# Patient Record
Sex: MALE | Race: BLACK OR AFRICAN AMERICAN | Employment: UNEMPLOYED | URBAN - METROPOLITAN AREA
[De-identification: names, ages, dates, MRNs, and addresses within clinical notes are randomized per-mention and may not be internally consistent; named-entity substitution may affect disease eponyms.]

---

## 2022-07-04 ENCOUNTER — APPOINTMENT (OUTPATIENT)
Dept: CT IMAGING | Age: 27
DRG: 053 | End: 2022-07-04
Attending: EMERGENCY MEDICINE
Payer: MEDICAID

## 2022-07-04 ENCOUNTER — APPOINTMENT (OUTPATIENT)
Dept: GENERAL RADIOLOGY | Age: 27
DRG: 053 | End: 2022-07-04
Attending: EMERGENCY MEDICINE
Payer: MEDICAID

## 2022-07-04 ENCOUNTER — APPOINTMENT (OUTPATIENT)
Dept: GENERAL RADIOLOGY | Age: 27
DRG: 053 | End: 2022-07-04
Attending: INTERNAL MEDICINE
Payer: MEDICAID

## 2022-07-04 ENCOUNTER — HOSPITAL ENCOUNTER (INPATIENT)
Age: 27
LOS: 5 days | Discharge: HOME | DRG: 053 | End: 2022-07-09
Attending: EMERGENCY MEDICINE | Admitting: HOSPITALIST
Payer: MEDICAID

## 2022-07-04 DIAGNOSIS — S09.90XA CLOSED HEAD INJURY, INITIAL ENCOUNTER: ICD-10-CM

## 2022-07-04 DIAGNOSIS — G40.901 STATUS EPILEPTICUS (HCC): Primary | ICD-10-CM

## 2022-07-04 DIAGNOSIS — S60.812A ABRASION OF LEFT WRIST, INITIAL ENCOUNTER: ICD-10-CM

## 2022-07-04 PROBLEM — F12.90 MARIJUANA USE: Status: ACTIVE | Noted: 2022-07-04

## 2022-07-04 PROBLEM — E87.0 HYPERNATREMIA: Status: ACTIVE | Noted: 2022-07-04

## 2022-07-04 PROBLEM — J96.90 RESPIRATORY FAILURE (HCC): Status: ACTIVE | Noted: 2022-07-04

## 2022-07-04 LAB
AMPHET UR QL SCN: NEGATIVE
ANION GAP SERPL CALC-SCNC: 20 MMOL/L (ref 3–18)
APPEARANCE UR: CLEAR
APTT PPP: 25 SEC (ref 23–36.4)
ARTERIAL PATENCY WRIST A: POSITIVE
ATRIAL RATE: 118 BPM
BACTERIA URNS QL MICRO: NEGATIVE /HPF
BARBITURATES UR QL SCN: NEGATIVE
BASE DEFICIT BLD-SCNC: 1.8 MMOL/L
BASE DEFICIT BLD-SCNC: 8.4 MMOL/L
BASOPHILS # BLD: 0 K/UL (ref 0–0.1)
BASOPHILS NFR BLD: 0 % (ref 0–2)
BDY SITE: ABNORMAL
BDY SITE: ABNORMAL
BENZODIAZ UR QL: POSITIVE
BILIRUB UR QL: NEGATIVE
BUN SERPL-MCNC: 19 MG/DL (ref 7–18)
BUN/CREAT SERPL: 13 (ref 12–20)
CALCIUM SERPL-MCNC: 9.8 MG/DL (ref 8.5–10.1)
CALCULATED P AXIS, ECG09: 44 DEGREES
CALCULATED R AXIS, ECG10: 86 DEGREES
CALCULATED T AXIS, ECG11: 32 DEGREES
CANNABINOIDS UR QL SCN: POSITIVE
CARBAMAZEPINE SERPL-MCNC: <0.5 UG/ML (ref 4–12)
CHLORIDE SERPL-SCNC: 113 MMOL/L (ref 100–111)
CK SERPL-CCNC: 431 U/L (ref 39–308)
CO2 SERPL-SCNC: 13 MMOL/L (ref 21–32)
COCAINE UR QL SCN: NEGATIVE
COLOR UR: YELLOW
CREAT SERPL-MCNC: 1.51 MG/DL (ref 0.6–1.3)
DIAGNOSIS, 93000: NORMAL
DIFFERENTIAL METHOD BLD: ABNORMAL
EOSINOPHIL # BLD: 0 K/UL (ref 0–0.4)
EOSINOPHIL NFR BLD: 0 % (ref 0–5)
EPITH CASTS URNS QL MICRO: NORMAL /LPF (ref 0–5)
ERYTHROCYTE [DISTWIDTH] IN BLOOD BY AUTOMATED COUNT: 11.8 % (ref 11.6–14.5)
ETHANOL SERPL-MCNC: <3 MG/DL (ref 0–3)
GAS FLOW.O2 O2 DELIVERY SYS: ABNORMAL L/MIN
GAS FLOW.O2 O2 DELIVERY SYS: ABNORMAL L/MIN
GAS FLOW.O2 SETTING OXYMISER: 18 BPM
GAS FLOW.O2 SETTING OXYMISER: 22 BPM
GLUCOSE BLD STRIP.AUTO-MCNC: 216 MG/DL (ref 70–110)
GLUCOSE BLD STRIP.AUTO-MCNC: 82 MG/DL (ref 70–110)
GLUCOSE SERPL-MCNC: 205 MG/DL (ref 74–99)
GLUCOSE UR STRIP.AUTO-MCNC: 500 MG/DL
HCO3 BLD-SCNC: 20.3 MMOL/L (ref 22–26)
HCO3 BLD-SCNC: 21.8 MMOL/L (ref 22–26)
HCT VFR BLD AUTO: 45.7 % (ref 36–48)
HDSCOM,HDSCOM: ABNORMAL
HGB BLD-MCNC: 14.4 G/DL (ref 13–16)
HGB UR QL STRIP: ABNORMAL
HYALINE CASTS URNS QL MICRO: NORMAL /LPF (ref 0–2)
IMM GRANULOCYTES # BLD AUTO: 0.1 K/UL (ref 0–0.04)
IMM GRANULOCYTES NFR BLD AUTO: 1 % (ref 0–0.5)
INR PPP: 1.1 (ref 0.8–1.2)
KETONES UR QL STRIP.AUTO: NEGATIVE MG/DL
LEUKOCYTE ESTERASE UR QL STRIP.AUTO: NEGATIVE
LYMPHOCYTES # BLD: 3.1 K/UL (ref 0.9–3.6)
LYMPHOCYTES NFR BLD: 29 % (ref 21–52)
MCH RBC QN AUTO: 31 PG (ref 24–34)
MCHC RBC AUTO-ENTMCNC: 31.5 G/DL (ref 31–37)
MCV RBC AUTO: 98.5 FL (ref 78–100)
METHADONE UR QL: NEGATIVE
MONOCYTES # BLD: 1 K/UL (ref 0.05–1.2)
MONOCYTES NFR BLD: 10 % (ref 3–10)
NEUTS SEG # BLD: 6.5 K/UL (ref 1.8–8)
NEUTS SEG NFR BLD: 61 % (ref 40–73)
NITRITE UR QL STRIP.AUTO: NEGATIVE
NRBC # BLD: 0 K/UL (ref 0–0.01)
NRBC BLD-RTO: 0 PER 100 WBC
O2/TOTAL GAS SETTING VFR VENT: 50 %
O2/TOTAL GAS SETTING VFR VENT: 50 %
OPIATES UR QL: NEGATIVE
P-R INTERVAL, ECG05: 142 MS
PCO2 BLD: 32.6 MMHG (ref 35–45)
PCO2 BLD: 53.9 MMHG (ref 35–45)
PCP UR QL: NEGATIVE
PEEP RESPIRATORY: 5 CMH2O
PEEP RESPIRATORY: 5 CMH2O
PH BLD: 7.18 [PH] (ref 7.35–7.45)
PH BLD: 7.43 [PH] (ref 7.35–7.45)
PH UR STRIP: 5.5 [PH] (ref 5–8)
PLATELET # BLD AUTO: 205 K/UL (ref 135–420)
PMV BLD AUTO: 11.6 FL (ref 9.2–11.8)
PO2 BLD: 103 MMHG (ref 80–100)
PO2 BLD: 136 MMHG (ref 80–100)
POTASSIUM SERPL-SCNC: 4 MMOL/L (ref 3.5–5.5)
PROT UR STRIP-MCNC: 30 MG/DL
PROTHROMBIN TIME: 14.3 SEC (ref 11.5–15.2)
Q-T INTERVAL, ECG07: 296 MS
QRS DURATION, ECG06: 84 MS
QTC CALCULATION (BEZET), ECG08: 414 MS
RBC # BLD AUTO: 4.64 M/UL (ref 4.35–5.65)
RBC #/AREA URNS HPF: NORMAL /HPF (ref 0–5)
SAO2 % BLD: 98.2 % (ref 92–97)
SAO2 % BLD: 98.2 % (ref 92–97)
SERVICE CMNT-IMP: ABNORMAL
SERVICE CMNT-IMP: ABNORMAL
SODIUM SERPL-SCNC: 146 MMOL/L (ref 136–145)
SP GR UR REFRACTOMETRY: 1.03 (ref 1–1.03)
SPECIMEN TYPE: ABNORMAL
SPECIMEN TYPE: ABNORMAL
UROBILINOGEN UR QL STRIP.AUTO: 0.2 EU/DL (ref 0.2–1)
VENTILATION MODE VENT: ABNORMAL
VENTILATION MODE VENT: ABNORMAL
VENTRICULAR RATE, ECG03: 118 BPM
VT SETTING VENT: 450 ML
VT SETTING VENT: 450 ML
WBC # BLD AUTO: 10.7 K/UL (ref 4.6–13.2)
WBC URNS QL MICRO: NEGATIVE /HPF (ref 0–5)

## 2022-07-04 PROCEDURE — 74011000250 HC RX REV CODE- 250: Performed by: INTERNAL MEDICINE

## 2022-07-04 PROCEDURE — 82962 GLUCOSE BLOOD TEST: CPT

## 2022-07-04 PROCEDURE — 74011000258 HC RX REV CODE- 258

## 2022-07-04 PROCEDURE — 95816 EEG AWAKE AND DROWSY: CPT | Performed by: PSYCHIATRY & NEUROLOGY

## 2022-07-04 PROCEDURE — 81001 URINALYSIS AUTO W/SCOPE: CPT

## 2022-07-04 PROCEDURE — 74011000250 HC RX REV CODE- 250: Performed by: EMERGENCY MEDICINE

## 2022-07-04 PROCEDURE — 70450 CT HEAD/BRAIN W/O DYE: CPT

## 2022-07-04 PROCEDURE — 74011250637 HC RX REV CODE- 250/637: Performed by: EMERGENCY MEDICINE

## 2022-07-04 PROCEDURE — 85730 THROMBOPLASTIN TIME PARTIAL: CPT

## 2022-07-04 PROCEDURE — 94002 VENT MGMT INPAT INIT DAY: CPT

## 2022-07-04 PROCEDURE — 0BH17EZ INSERTION OF ENDOTRACHEAL AIRWAY INTO TRACHEA, VIA NATURAL OR ARTIFICIAL OPENING: ICD-10-PCS | Performed by: EMERGENCY MEDICINE

## 2022-07-04 PROCEDURE — 31500 INSERT EMERGENCY AIRWAY: CPT

## 2022-07-04 PROCEDURE — 85025 COMPLETE CBC W/AUTO DIFF WBC: CPT

## 2022-07-04 PROCEDURE — 74018 RADEX ABDOMEN 1 VIEW: CPT

## 2022-07-04 PROCEDURE — 82077 ASSAY SPEC XCP UR&BREATH IA: CPT

## 2022-07-04 PROCEDURE — 74011250636 HC RX REV CODE- 250/636: Performed by: INTERNAL MEDICINE

## 2022-07-04 PROCEDURE — 96374 THER/PROPH/DIAG INJ IV PUSH: CPT

## 2022-07-04 PROCEDURE — 74011250636 HC RX REV CODE- 250/636: Performed by: PSYCHIATRY & NEUROLOGY

## 2022-07-04 PROCEDURE — 51702 INSERT TEMP BLADDER CATH: CPT

## 2022-07-04 PROCEDURE — 74011000258 HC RX REV CODE- 258: Performed by: PSYCHIATRY & NEUROLOGY

## 2022-07-04 PROCEDURE — 93005 ELECTROCARDIOGRAM TRACING: CPT

## 2022-07-04 PROCEDURE — 80048 BASIC METABOLIC PNL TOTAL CA: CPT

## 2022-07-04 PROCEDURE — 82803 BLOOD GASES ANY COMBINATION: CPT

## 2022-07-04 PROCEDURE — 99285 EMERGENCY DEPT VISIT HI MDM: CPT

## 2022-07-04 PROCEDURE — 74011000636 HC RX REV CODE- 636: Performed by: EMERGENCY MEDICINE

## 2022-07-04 PROCEDURE — 73110 X-RAY EXAM OF WRIST: CPT

## 2022-07-04 PROCEDURE — 74011250636 HC RX REV CODE- 250/636: Performed by: EMERGENCY MEDICINE

## 2022-07-04 PROCEDURE — 77010033678 HC OXYGEN DAILY

## 2022-07-04 PROCEDURE — C9254 INJECTION, LACOSAMIDE: HCPCS | Performed by: PSYCHIATRY & NEUROLOGY

## 2022-07-04 PROCEDURE — 74011000250 HC RX REV CODE- 250: Performed by: PSYCHIATRY & NEUROLOGY

## 2022-07-04 PROCEDURE — 80156 ASSAY CARBAMAZEPINE TOTAL: CPT

## 2022-07-04 PROCEDURE — 94762 N-INVAS EAR/PLS OXIMTRY CONT: CPT

## 2022-07-04 PROCEDURE — C9113 INJ PANTOPRAZOLE SODIUM, VIA: HCPCS | Performed by: INTERNAL MEDICINE

## 2022-07-04 PROCEDURE — 36600 WITHDRAWAL OF ARTERIAL BLOOD: CPT

## 2022-07-04 PROCEDURE — 74011000258 HC RX REV CODE- 258: Performed by: EMERGENCY MEDICINE

## 2022-07-04 PROCEDURE — 71260 CT THORAX DX C+: CPT

## 2022-07-04 PROCEDURE — 74011250636 HC RX REV CODE- 250/636

## 2022-07-04 PROCEDURE — 72125 CT NECK SPINE W/O DYE: CPT

## 2022-07-04 PROCEDURE — 80307 DRUG TEST PRSMV CHEM ANLYZR: CPT

## 2022-07-04 PROCEDURE — 65610000006 HC RM INTENSIVE CARE

## 2022-07-04 PROCEDURE — 82550 ASSAY OF CK (CPK): CPT

## 2022-07-04 PROCEDURE — 85610 PROTHROMBIN TIME: CPT

## 2022-07-04 PROCEDURE — 5A1945Z RESPIRATORY VENTILATION, 24-96 CONSECUTIVE HOURS: ICD-10-PCS | Performed by: EMERGENCY MEDICINE

## 2022-07-04 PROCEDURE — 71045 X-RAY EXAM CHEST 1 VIEW: CPT

## 2022-07-04 RX ORDER — PROPOFOL 10 MG/ML
INJECTION, EMULSION INTRAVENOUS
Status: COMPLETED
Start: 2022-07-04 | End: 2022-07-04

## 2022-07-04 RX ORDER — ALBUTEROL SULFATE 2.5 MG/.5ML
2.5 SOLUTION RESPIRATORY (INHALATION)
Status: DISCONTINUED | OUTPATIENT
Start: 2022-07-04 | End: 2022-07-09 | Stop reason: HOSPADM

## 2022-07-04 RX ORDER — ROCURONIUM BROMIDE 10 MG/ML
1 INJECTION, SOLUTION INTRAVENOUS
Status: COMPLETED | OUTPATIENT
Start: 2022-07-04 | End: 2022-07-04

## 2022-07-04 RX ORDER — MIDAZOLAM HYDROCHLORIDE 1 MG/ML
4 INJECTION, SOLUTION INTRAMUSCULAR; INTRAVENOUS ONCE
Status: COMPLETED | OUTPATIENT
Start: 2022-07-04 | End: 2022-07-04

## 2022-07-04 RX ORDER — MIDAZOLAM HYDROCHLORIDE 1 MG/ML
4 INJECTION, SOLUTION INTRAMUSCULAR; INTRAVENOUS
Status: DISCONTINUED | OUTPATIENT
Start: 2022-07-04 | End: 2022-07-09 | Stop reason: HOSPADM

## 2022-07-04 RX ORDER — MIDAZOLAM HYDROCHLORIDE 1 MG/ML
5 INJECTION, SOLUTION INTRAMUSCULAR; INTRAVENOUS ONCE
Status: COMPLETED | OUTPATIENT
Start: 2022-07-04 | End: 2022-07-04

## 2022-07-04 RX ORDER — ENOXAPARIN SODIUM 100 MG/ML
40 INJECTION SUBCUTANEOUS EVERY 24 HOURS
Status: DISCONTINUED | OUTPATIENT
Start: 2022-07-04 | End: 2022-07-09 | Stop reason: HOSPADM

## 2022-07-04 RX ORDER — SODIUM CHLORIDE 9 MG/ML
100 INJECTION, SOLUTION INTRAVENOUS CONTINUOUS
Status: DISCONTINUED | OUTPATIENT
Start: 2022-07-04 | End: 2022-07-09 | Stop reason: HOSPADM

## 2022-07-04 RX ORDER — MIDAZOLAM HYDROCHLORIDE 1 MG/ML
1 INJECTION, SOLUTION INTRAMUSCULAR; INTRAVENOUS
Status: DISCONTINUED | OUTPATIENT
Start: 2022-07-04 | End: 2022-07-09 | Stop reason: HOSPADM

## 2022-07-04 RX ORDER — ETOMIDATE 2 MG/ML
0.3 INJECTION INTRAVENOUS
Status: COMPLETED | OUTPATIENT
Start: 2022-07-04 | End: 2022-07-04

## 2022-07-04 RX ORDER — FENTANYL CITRATE 50 UG/ML
25 INJECTION, SOLUTION INTRAMUSCULAR; INTRAVENOUS
Status: DISCONTINUED | OUTPATIENT
Start: 2022-07-04 | End: 2022-07-08

## 2022-07-04 RX ORDER — MIDAZOLAM HYDROCHLORIDE 1 MG/ML
5 INJECTION, SOLUTION INTRAMUSCULAR; INTRAVENOUS ONCE
Status: DISCONTINUED | OUTPATIENT
Start: 2022-07-04 | End: 2022-07-04

## 2022-07-04 RX ORDER — MIDAZOLAM IN 0.9 % SOD.CHLORID 1 MG/ML
0-5 PLASTIC BAG, INJECTION (ML) INTRAVENOUS
Status: DISCONTINUED | OUTPATIENT
Start: 2022-07-04 | End: 2022-07-09 | Stop reason: HOSPADM

## 2022-07-04 RX ORDER — PHENYTOIN SODIUM 50 MG/ML
100 INJECTION, SOLUTION INTRAMUSCULAR; INTRAVENOUS EVERY 8 HOURS
Status: DISCONTINUED | OUTPATIENT
Start: 2022-07-04 | End: 2022-07-06

## 2022-07-04 RX ORDER — CHLORHEXIDINE GLUCONATE 1.2 MG/ML
10 RINSE ORAL ONCE
Status: DISCONTINUED | OUTPATIENT
Start: 2022-07-04 | End: 2022-07-04 | Stop reason: SDUPTHER

## 2022-07-04 RX ORDER — PROPOFOL 10 MG/ML
0-50 VIAL (ML) INTRAVENOUS
Status: DISCONTINUED | OUTPATIENT
Start: 2022-07-04 | End: 2022-07-09 | Stop reason: HOSPADM

## 2022-07-04 RX ORDER — CLINDAMYCIN PHOSPHATE 600 MG/50ML
600 INJECTION INTRAVENOUS
Status: DISCONTINUED | OUTPATIENT
Start: 2022-07-04 | End: 2022-07-04

## 2022-07-04 RX ORDER — CHLORHEXIDINE GLUCONATE 1.2 MG/ML
10 RINSE ORAL EVERY 12 HOURS
Status: DISCONTINUED | OUTPATIENT
Start: 2022-07-04 | End: 2022-07-07

## 2022-07-04 RX ORDER — ATROPA BELLADONNA AND OPIUM 16.2; 3 MG/1; MG/1
1 SUPPOSITORY RECTAL
Status: COMPLETED | OUTPATIENT
Start: 2022-07-04 | End: 2022-07-04

## 2022-07-04 RX ADMIN — PROPOFOL 50 MCG/KG/MIN: 10 INJECTION, EMULSION INTRAVENOUS at 14:38

## 2022-07-04 RX ADMIN — PROPOFOL 50 MCG/KG/MIN: 10 INJECTION, EMULSION INTRAVENOUS at 10:45

## 2022-07-04 RX ADMIN — VALPROATE SODIUM 1000 MG: 100 INJECTION, SOLUTION INTRAVENOUS at 14:48

## 2022-07-04 RX ADMIN — MIDAZOLAM 10 MG/HR: 5 INJECTION INTRAMUSCULAR; INTRAVENOUS at 19:05

## 2022-07-04 RX ADMIN — ATROPA BELLADONNA AND OPIUM 1 SUPPOSITORY: 16.2; 3 SUPPOSITORY RECTAL at 08:17

## 2022-07-04 RX ADMIN — VALPROATE SODIUM 1000 MG: 100 INJECTION, SOLUTION INTRAVENOUS at 21:15

## 2022-07-04 RX ADMIN — PROPOFOL 50 MCG/KG/MIN: 10 INJECTION, EMULSION INTRAVENOUS at 23:13

## 2022-07-04 RX ADMIN — IOPAMIDOL 100 ML: 612 INJECTION, SOLUTION INTRAVENOUS at 07:16

## 2022-07-04 RX ADMIN — PROPOFOL 15 MCG/KG/MIN: 10 INJECTION, EMULSION INTRAVENOUS at 07:06

## 2022-07-04 RX ADMIN — FENTANYL CITRATE 50 MCG/HR: 0.05 INJECTION, SOLUTION INTRAMUSCULAR; INTRAVENOUS at 11:40

## 2022-07-04 RX ADMIN — ROCURONIUM BROMIDE 81.6 MG: 10 INJECTION INTRAVENOUS at 07:09

## 2022-07-04 RX ADMIN — LEVETIRACETAM 1000 MG: 100 INJECTION, SOLUTION INTRAVENOUS at 08:52

## 2022-07-04 RX ADMIN — MIDAZOLAM HYDROCHLORIDE 4 MG: 1 INJECTION, SOLUTION INTRAMUSCULAR; INTRAVENOUS at 06:45

## 2022-07-04 RX ADMIN — SODIUM CHLORIDE 200 MG: 9 INJECTION, SOLUTION INTRAVENOUS at 14:48

## 2022-07-04 RX ADMIN — 0.12% CHLORHEXIDINE GLUCONATE 10 ML: 1.2 RINSE ORAL at 14:48

## 2022-07-04 RX ADMIN — PHENYTOIN SODIUM 1500 MG: 50 INJECTION INTRAMUSCULAR; INTRAVENOUS at 17:35

## 2022-07-04 RX ADMIN — ETOMIDATE INJECTION 24.48 MG: 2 SOLUTION INTRAVENOUS at 07:09

## 2022-07-04 RX ADMIN — PROPOFOL 50 MCG/KG/MIN: 10 INJECTION, EMULSION INTRAVENOUS at 19:05

## 2022-07-04 RX ADMIN — SODIUM CHLORIDE 200 MG: 9 INJECTION, SOLUTION INTRAVENOUS at 21:14

## 2022-07-04 RX ADMIN — SODIUM CHLORIDE 40 MG: 9 INJECTION, SOLUTION INTRAMUSCULAR; INTRAVENOUS; SUBCUTANEOUS at 14:48

## 2022-07-04 RX ADMIN — MIDAZOLAM HYDROCHLORIDE 5 MG: 1 INJECTION, SOLUTION INTRAMUSCULAR; INTRAVENOUS at 08:58

## 2022-07-04 RX ADMIN — ENOXAPARIN SODIUM 40 MG: 100 INJECTION SUBCUTANEOUS at 14:48

## 2022-07-04 RX ADMIN — MIDAZOLAM 2 MG/HR: 5 INJECTION INTRAMUSCULAR; INTRAVENOUS at 10:34

## 2022-07-04 RX ADMIN — SODIUM CHLORIDE 100 ML/HR: 9 INJECTION, SOLUTION INTRAVENOUS at 11:40

## 2022-07-04 RX ADMIN — 0.12% CHLORHEXIDINE GLUCONATE 10 ML: 1.2 RINSE ORAL at 21:19

## 2022-07-04 RX ADMIN — PHENYTOIN SODIUM 100 MG: 50 INJECTION INTRAMUSCULAR; INTRAVENOUS at 21:19

## 2022-07-04 NOTE — CONSULTS
NEUROLOGY CONSULTATION NOTE    Patient: Molly Fong MRN: [id number]  CSN: [id number]    YOB: 1995  Age: 33 y.o. Sex: male    DOA: 7/4/2022 LOS:  LOS: 0 days        Requesting Physician: Dr. Kristopher Vasquez  Reason for Consultation: Seizure            HISTORY OF PRESENT ILLNESS:   Molly Fong is a 33 y.o. male with past medical history of long-term refractory epilepsy who had both VNS and bilateral temporal lobe RNS who was sent to emergency room by EMS for unresponsiveness. According to records, patient was found to be unresponsive and was combative when he was emergency room. He was also found to have a large abrasion to his left upper arm. At emergency room, he had another breakthrough seizure. Patient is intubated for airway protection. According to records, patient supposed to take Depakote 1000 mg twice daily, Vimpat 200 mg twice daily, Zonegran 300 mg nightly and Klonopin 0.5 mg nightly. Patient was recently admitted to [facility] for breakthrough seizure. PAST MEDICAL HISTORY:  Past Medical History:   Diagnosis Date    Seizure [facility])      PAST SURGICAL HISTORY:  No past surgical history on file. FAMILY HISTORY:  No family history on file.   SOCIAL HISTORY:  Social History     Socioeconomic History    Marital status: SINGLE     MEDICATIONS:  Current Facility-Administered Medications   Medication Dose Route Frequency    propofol (DIPRIVAN) 10 mg/mL infusion  0-50 mcg/kg/min IntraVENous TITRATE    midazolam in normal saline (VERSED) 1 mg/mL infusion  0-10 mg/hr IntraVENous TITRATE    midazolam (VERSED) injection 1 mg  1 mg IntraVENous Q2H PRN    fentaNYL citrate (PF) injection 25 mcg  25 mcg IntraVENous Q4H PRN    chlorhexidine (PERIDEX) 0.12 % mouthwash 10 mL  10 mL Oral Q12H    pantoprazole (PROTONIX) 40 mg in 0.9% sodium chloride 10 mL injection  40 mg IntraVENous Q24H    0.9% sodium chloride infusion  100 mL/hr IntraVENous CONTINUOUS    enoxaparin (LOVENOX) injection 40 mg  40 mg SubCUTAneous Q24H    albuterol CONCENTRATE 2.5mg/0.5 mL neb soln  2.5 mg Nebulization Q4H PRN    fentaNYL (PF) 900 mcg/30 ml infusion soln  0-200 mcg/hr IntraVENous TITRATE     Prior to Admission medications    Medication Sig Start Date End Date Taking? Authorizing Provider   carBAMazepine (TEGretoL) 200 mg tablet Take 200 mg by mouth three (3) times daily. Other, MD Ángela       ALLERGIES:  No Known Allergies    Review of Systems  Unable to obtain. PHYSICAL EXAMINATION:     Visit Vitals  /88   Pulse 74   Temp 98 °F (36.7 °C)   Resp 20   Wt 81.6 kg (179 lb 14.3 oz)   SpO2 100%   BMI 29.04 kg/m²         GENERAL: Intubated and on propofol. HEENT: Moist mucous membranes, sclerae anicteric, scalp is atraumatic. CVS: Regular rate and rhythm, no murmurs or gallops. No carotid bruits. PULMONARY: on ventilation  EXTREMITIES: No spontaneous movement  ABDOMEN: Soft, nontender. SKIN: No rashes or ecchymoses. Warm and dry. NEUROLOGIC: Intubated and sedated. Not arousal to verbal or physical stimuli  Cranial nerves: Face is symmetric, PERRLA, bilateral face grimace is symmetric. Motor: No spontaneous movement on all four extremities  Sensory: No withdrawal in four extremities on pain stimuli. Coordination: Deferred due to unconsciousness. Deep tendon reflexes: Deep tendon reflex are increased bilateral lower extremities, positive crossed leg adduction. Gait assessment: Deferred due to unconsciousness.     Labs: Results:       Chemistry Recent Labs     07/04/22  0630   *   *   K 4.0   *   CO2 13*   BUN 19*   CREA 1.51*   CA 9.8   AGAP 20*   BUCR 13      CBC w/Diff Recent Labs     07/04/22  0630   WBC 10.7   RBC 4.64   HGB 14.4   HCT 45.7      GRANS 61   LYMPH 29   EOS 0      Cardiac Enzymes Recent Labs     07/04/22  0630   *      Coagulation Recent Labs     07/04/22  0630   PTP 14.3   INR 1.1   APTT 25.0       Lipid Panel No results found for: CHOL, CHOLPOCT, [facility], CHLST, CHOLV, [id number], HDL, HDLP, LDL, LDLC, DLDLP, [id number], VLDLC, VLDL, TGLX, TRIGL, TRIGP, TGLPOCT, CHHD, CHHDX   BNP No results for input(s): BNPP in the last 72 hours. Liver Enzymes No results for input(s): TP, ALB, TBIL, AP in the last 72 hours. No lab exists for component: SGOT, GPT, DBIL   Thyroid Studies No results found for: T4, T3U, TSH, TSHEXT       Radiology:  XR WRIST LT AP/LAT/OBL MIN 3V    Result Date: 7/4/2022  EXAM: 3 views left wrist CLINICAL INDICATION/HISTORY: Left wrist pain COMPARISON: None. _______________ FINDINGS: No fracture or dislocation. Joint spaces are maintained. _______________     1. No fracture or dislocation. CT HEAD WO CONT    Result Date: 7/4/2022  EXAM: CT head CLINICAL INDICATION/HISTORY: Fall, seizure COMPARISON: None. TECHNIQUE: Axial CT imaging of the head was performed without intravenous contrast. One or more dose reduction techniques were used on this CT: automated exposure control, adjustment of the mAs and/or kVp according to patient size, and iterative reconstruction techniques. The specific techniques used on this CT exam have been documented in the patient's electronic medical record. Digital Imaging and Communications in Medicine (DICOM) format image data are available to nonaffiliated external healthcare facilities or entities on a secured, media free, reciprocally searchable basis with patient authorization for at least a 12-month period after this study. _______________ FINDINGS: BRAIN AND EXTRA-AXIAL SPACES: Brain stimulator electrodes identified. No intracranial hemorrhage, midline shift, or uncal herniation. There are no areas of abnormal parenchymal attenuation. There are no abnormal extra-axial fluid collections. CALVARIUM: Postsurgical changes involving right frontoparietal calvarium. SINUSES: Clear. OTHER: None. _______________     1. No acute intracranial abnormalities.     CT CHEST ABD PELV W CONT    Result Date: 7/4/2022  EXAM: CT of the chest, abdomen, and pelvis CLINICAL INDICATION/HISTORY: Seizure, chest pain, abdominal pain COMPARISON: None. TECHNIQUE: Axial CT imaging of the chest, abdomen, and pelvis was performed with intravenous contrast. Multiplanar reformats were generated. One or more dose reduction techniques were used on this CT: automated exposure control, adjustment of the mAs and/or kVp according to patient size, and iterative reconstruction techniques. The specific techniques used on this CT exam have been documented in the patient's electronic medical record. Digital Imaging and Communications in Medicine (DICOM) format image data are available to nonaffiliated external healthcare facilities or entities on a secured, media free, reciprocally searchable basis with patient authorization for at least a 12-month period after this study. _______________ FINDINGS: CHEST: LUNGS: Patchy atelectasis and/or infiltrate in the lower lobes. AIRWAY: Endotracheal tube is in place. Secretions identified in the airway. PLEURA: Normal, with no effusion or pneumothorax. MEDIASTINUM: Normal heart size. No pericardial effusion. Vasculature is unremarkable. LYMPH NODES: No enlarged lymph nodes. OTHER: None. =============== ABDOMEN/PELVIS: LIVER, BILIARY: Liver is unremarkable. No biliary dilation. Gallbladder is unremarkable. PANCREAS: Unremarkable. SPLEEN: Unremarkable. ADRENALS: Unremarkable. KIDNEYS/URETERS/BLADDER: Unremarkable. PELVIC ORGANS: Unremarkable. LYMPH NODES: No enlarged lymph nodes. GASTROINTESTINAL TRACT: No bowel dilation or wall thickening. VASCULATURE: Unremarkable. BONES: No acute or aggressive osseous abnormalities identified. OTHER: None. _______________     1. No traumatic injury identified. 2. Bilateral lower lobe opacities could relate to atelectasis, infiltrate, and/or aspiration. CT SPINE CERV WO CONT    Result Date: 7/4/2022  EXAM: CT cervical spine CLINICAL INDICATION/HISTORY: Trauma, neck pain. COMPARISON: None.  TECHNIQUE: Axial CT imaging of the cervical spine was performed from the skull base to the thoracic inlet without intravenous contrast. Multiplanar reformats were generated. One or more dose reduction techniques were used on this CT: automated exposure control, adjustment of the mAs and/or kVp according to patient size, and iterative reconstruction techniques. The specific techniques used on this CT exam have been documented in the patient's electronic medical record. Digital Imaging and Communications in Medicine (DICOM) format image data are available to nonaffiliated external healthcare facilities or entities on a secured, media free, reciprocally searchable basis with patient authorization for at least a 12-month period after this study. _______________ FINDINGS: VERTEBRAE AND DISCS: Alignment and vertebral height is maintained with no fracture or traumatic subluxation identified. SPINAL CANAL AND FORAMINA: Patent without significant bony canal or foramina encroachment. PREVERTEBRAL SOFT TISSUES: No prevertebral soft tissue swelling. VISIBLE PORTIONS OF POSTERIOR FOSSA/BRAIN: Unremarkable. LUNG APICES: Clear. OTHER: None. _______________     1. No cervical spine fracture. XR CHEST PORT    Result Date: 7/4/2022  EXAM: CHEST RADIOGRAPH CLINICAL INDICATION/HISTORY: pain   > Additional: Chest pain COMPARISON: None TECHNIQUE: Portable frontal view of the chest _______________ FINDINGS: SUPPORT DEVICES: Endotracheal tube is in good position. HEART AND MEDIASTINUM: Heart size is normal. LUNGS AND PLEURAL SPACES: Bilateral basilar atelectasis and or infiltrate. No pleural effusion or pneumothorax. BONES AND SOFT TISSUES: Stimulator generator projects over the left chest. _______________     1. Endotracheal tube is in good position. 2. Bilateral basilar atelectasis and/or infiltrate.     ASSESSMENT/IMPRESSION:  28-year-old male with longstanding refractory seizures status post VNS and bilateral temporal RNS placement presents with seizure and postictal confusion. He is intubated for airway protection. Head CT is normal.  1. Epilepsy: Seizure has resolved clinically. RECOMMENDATIONS:  1. Continue Depakote 1000 mg twice daily and Vimpat 200 mg twice daily. Add oral Zonegran 300 mg nightly and Klonopin 0.5 mg nightly after extubation. 2. EEG      I will follow the patient.  Please do not hesitate to return with any questions.    ------------------------------------  Umm Montiel MD  7/4/2022  12:26 PM

## 2022-07-04 NOTE — PROGRESS NOTES
TRANSFER - IN REPORT:    Verbal report received from [place], RN(name) on Dg Palmer Apple  being received from ER(unit) for routine progression of care      Report consisted of patients Situation, Background, Assessment and   Recommendations(SBAR). Information from the following report(s) SBAR, Kardex, Intake/Output and Recent Results was reviewed with the receiving nurse. Opportunity for questions and clarification was provided. Assessment completed upon patients arrival to unit and care assumed. 1419 Patient placed on EEG machine. Seizure activity present, lasting about 3 minutes. Visibly shaking. Per Dr. John Roldan, do not give Versed due to capturing on EEG. Unable to assess post ictal state as patient is intubated and sedated. Per EEG tech, patient will be connected to machine overnight for surveillance. Versed returned to xis however, would only let nurse scan one vial, 2 vials returned. 1600 Reassessment, no changes. Mother in to visit. States that patient has a VNS and a RNS implant. 1900 Bedside and Verbal shift change report given to Nehemiah Olivo RN (oncoming nurse) by Regine Quintanilla RN (offgoing nurse). Report included the following information SBAR, Kardex, Intake/Output and Recent Results.

## 2022-07-04 NOTE — ED PROVIDER NOTES
EMERGENCY DEPARTMENT HISTORY AND PHYSICAL EXAM    Date: 7/4/2022  Patient Name: Ramiro Ervin    History of Presenting Illness     No chief complaint on file. History Provided By: Patient    Additional History (Context):   9:03 AM  Dg Loo Junior is a 33 y.o. male with PMHX of narcolepsy and sleepwalking, partial complex seizures, questionable compliance, polysubstance abuse who presents to the emergency department possible seizure versus trauma. Paramedics brought patient in after being called for an unresponsive patient outdoors. They found him unresponsive lying down in a parking lot. This report was that he was a seizure patient who was found unresponsive and now postictal being brought in for evaluation and treatment for his seizure. Upon arrival we found that he had a large abrasion to his left upper arm and was not allowing them to start an IV. In addition he had a large contusion noted above the left eyebrow. Patient was postictal.  As per transferring ever towards the bed patient had another breakthrough seizure. At this point we went to place a c-collar on him and ordered blood glucose. Accu-Chek was 216. No further history is available aside from taken to patient records.     Social History  Unknown any available through patient records    Family History  No known available through patient records        PCP: Tremaine Cortes MD    Current Facility-Administered Medications   Medication Dose Route Frequency Provider Last Rate Last Admin    propofol (DIPRIVAN) 10 mg/mL infusion  0-50 mcg/kg/min IntraVENous TITRATE Lynn Mulligan MD 17.1 mL/hr at 07/04/22 0851 35 mcg/kg/min at 07/04/22 0851    levETIRAcetam (KEPPRA) 1,000 mg in 0.9% sodium chloride 100 mL IVPB  1,000 mg IntraVENous ONCE Lynn Mulligan  mL/hr at 07/04/22 0852 1,000 mg at 07/04/22 1298     Current Outpatient Medications   Medication Sig Dispense Refill    carBAMazepine (TEGretoL) 200 mg tablet Take 200 mg by mouth three (3) times daily. Past History     Past Medical History:  Past Medical History:   Diagnosis Date    Seizure [facility])        Past Surgical History:  No past surgical history on file. Family History:  No family history on file. Social History:  Social History     Tobacco Use    Smoking status: Not on file    Smokeless tobacco: Not on file   Substance Use Topics    Alcohol use: Not on file    Drug use: Not on file       Allergies:  No Known Allergies      Review of Systems   Review of Systems   Unable to perform ROS: Mental status change       Unknow  Physical Exam     Vitals:    07/04/22 0811 07/04/22 0826 07/04/22 0831 07/04/22 0834   BP: (!) 165/87   (!) 140/67   Pulse: 93 88  95   Resp: 22 24  24   Temp:       SpO2: 100% 100% 100% 100%   Weight:         Physical Exam  Vitals and nursing note reviewed. Constitutional:       General: He is not in acute distress. Appearance: He is well-developed. He is not diaphoretic. HENT:      Head: Normocephalic. No raccoon eyes, Funes's sign, right periorbital erythema or left periorbital erythema. Jaw: There is normal jaw occlusion. Comments: Prominent contusion noted above the left supraorbital ridge. There is no periorbital ecchymoses or funes's eyes. Ears:      Comments: Blood or fluid from ears or nose. Mouth/Throat:      Comments: No visible airway lesions. Eyes:      General: No scleral icterus. Extraocular Movements:      Right eye: Normal extraocular motion. Left eye: Normal extraocular motion. Conjunctiva/sclera: Conjunctivae normal.      Pupils: Pupils are equal, round, and reactive to light. Neck:      Trachea: No tracheal deviation. Cardiovascular:      Rate and Rhythm: Regular rhythm. Tachycardia present. Heart sounds: Normal heart sounds. Pulmonary:      Effort: No respiratory distress. Breath sounds: No stridor. Comments: Few scattered rhonchi.   He has a diminished respiratory effort. Chest:       Abdominal:      General: Bowel sounds are absent. There is no distension. Palpations: Abdomen is soft. Tenderness: There is no abdominal tenderness. There is no rebound. Comments: Sounds are absent   Musculoskeletal:         General: No tenderness. Normal range of motion. Hands:       Cervical back: Normal range of motion and neck supple. Comments: Grossly unremarkable without abnormalities   Skin:     General: Skin is warm and dry. Capillary Refill: Capillary refill takes less than 2 seconds. Findings: No erythema or rash. Neurological:      GCS: GCS eye subscore is 3. GCS verbal subscore is 3. GCS motor subscore is 5. Cranial Nerves: No cranial nerve deficit. Motor: No weakness. Deep Tendon Reflexes:      Reflex Scores:       Patellar reflexes are 2+ on the right side and 2+ on the left side. Achilles reflexes are 2+ on the right side and 2+ on the left side. Comments: Patient stuporous and his responsiveness. Basic reflexes appear to be intact. No clonus. Psychiatric:         Behavior: Behavior is uncooperative and agitated. Diagnostic Study Results     Labs -  Recent Results (from the past 24 hour(s))   CBC WITH AUTOMATED DIFF    Collection Time: 07/04/22  6:30 AM   Result Value Ref Range    WBC 10.7 4.6 - 13.2 K/uL    RBC 4.64 4.35 - 5.65 M/uL    HGB 14.4 13.0 - 16.0 g/dL    HCT 45.7 36.0 - 48.0 %    MCV 98.5 78.0 - 100.0 FL    MCH 31.0 24.0 - 34.0 PG    MCHC 31.5 31.0 - 37.0 g/dL    RDW 11.8 11.6 - 14.5 %    PLATELET 992 585 - 954 K/uL    MPV 11.6 9.2 - 11.8 FL    NRBC 0.0 0  WBC    ABSOLUTE NRBC 0.00 0.00 - 0.01 K/uL    NEUTROPHILS 61 40 - 73 %    LYMPHOCYTES 29 21 - 52 %    MONOCYTES 10 3 - 10 %    EOSINOPHILS 0 0 - 5 %    BASOPHILS 0 0 - 2 %    IMMATURE GRANULOCYTES 1 (H) 0.0 - 0.5 %    ABS. NEUTROPHILS 6.5 1.8 - 8.0 K/UL    ABS. LYMPHOCYTES 3.1 0.9 - 3.6 K/UL    ABS.  MONOCYTES 1.0 0.05 - 1.2 K/UL    ABS. EOSINOPHILS 0.0 0.0 - 0.4 K/UL    ABS. BASOPHILS 0.0 0.0 - 0.1 K/UL    ABS. IMM.  GRANS. 0.1 (H) 0.00 - 0.04 K/UL    DF AUTOMATED     METABOLIC PANEL, BASIC    Collection Time: 07/04/22  6:30 AM   Result Value Ref Range    Sodium 146 (H) 136 - 145 mmol/L    Potassium 4.0 3.5 - 5.5 mmol/L    Chloride 113 (H) 100 - 111 mmol/L    CO2 13 (L) 21 - 32 mmol/L    Anion gap 20 (H) 3.0 - 18 mmol/L    Glucose 205 (H) 74 - 99 mg/dL    BUN 19 (H) 7.0 - 18 MG/DL    Creatinine 1.51 (H) 0.6 - 1.3 MG/DL    BUN/Creatinine ratio 13 12 - 20      GFR est AA >60 >60 ml/min/1.73m2    GFR est non-AA 56 (L) >60 ml/min/1.73m2    Calcium 9.8 8.5 - 10.1 MG/DL   PTT    Collection Time: 07/04/22  6:30 AM   Result Value Ref Range    aPTT 25.0 23.0 - 36.4 SEC   PROTHROMBIN TIME + INR    Collection Time: 07/04/22  6:30 AM   Result Value Ref Range    Prothrombin time 14.3 11.5 - 15.2 sec    INR 1.1 0.8 - 1.2     ETHYL ALCOHOL    Collection Time: 07/04/22  6:30 AM   Result Value Ref Range    ALCOHOL(ETHYL),SERUM <3 0 - 3 MG/DL   GLUCOSE, POC    Collection Time: 07/04/22  6:42 AM   Result Value Ref Range    Glucose (POC) 216 (H) 70 - 110 mg/dL   URINALYSIS W/ RFLX MICROSCOPIC    Collection Time: 07/04/22  7:50 AM   Result Value Ref Range    Color YELLOW      Appearance CLEAR      Specific gravity 1.030 1.005 - 1.030      pH (UA) 5.5 5.0 - 8.0      Protein 30 (A) NEG mg/dL    Glucose 500 (A) NEG mg/dL    Ketone Negative NEG mg/dL    Bilirubin Negative NEG      Blood TRACE (A) NEG      Urobilinogen 0.2 0.2 - 1.0 EU/dL    Nitrites Negative NEG      Leukocyte Esterase Negative NEG     BLOOD GAS, ARTERIAL POC    Collection Time: 07/04/22  8:04 AM   Result Value Ref Range    Device: ADULT VENT      FIO2 (POC) 50 %    pH (POC) 7.18 (LL) 7.35 - 7.45      pCO2 (POC) 53.9 (H) 35.0 - 45.0 MMHG    pO2 (POC) 136 (H) 80 - 100 MMHG    HCO3 (POC) 20.3 (L) 22 - 26 MMOL/L    sO2 (POC) 98.2 (H) 92 - 97 %    Base deficit (POC) 8.4 mmol/L    Mode ASSIST CONTROL      Tidal volume 450 ml    Set Rate 18 bpm    PEEP/CPAP (POC) 5 cmH2O    Allens test (POC) Positive      Site LEFT BRACHIAL      Specimen type (POC) ARTERIAL      Performed by [facility]         Radiologic Studies -   CT CHEST ABD PELV W CONT   Final Result      1. No traumatic injury identified. 2. Bilateral lower lobe opacities could relate to atelectasis, infiltrate,   and/or aspiration. CT SPINE CERV WO CONT   Final Result      1. No cervical spine fracture. CT HEAD WO CONT   Final Result      1. No acute intracranial abnormalities. XR CHEST PORT    (Results Pending)     CT Results  (Last 48 hours)               07/04/22 0740  CT CHEST ABD PELV W CONT Final result    Impression:      1. No traumatic injury identified. 2. Bilateral lower lobe opacities could relate to atelectasis, infiltrate,   and/or aspiration. Narrative:  EXAM: CT of the chest, abdomen, and pelvis       CLINICAL INDICATION/HISTORY: Seizure, chest pain, abdominal pain       COMPARISON: None. TECHNIQUE: Axial CT imaging of the chest, abdomen, and pelvis was performed with   intravenous contrast. Multiplanar reformats were generated. One or more dose   reduction techniques were used on this CT: automated exposure control,   adjustment of the mAs and/or kVp according to patient size, and iterative   reconstruction techniques. The specific techniques used on this CT exam have   been documented in the patient's electronic medical record. Digital Imaging and   Communications in Medicine (DICOM) format image data are available to   nonaffiliated external healthcare facilities or entities on a secured, media   free, reciprocally searchable basis with patient authorization for at least a   12-month period after this study. _______________       FINDINGS:       CHEST:       LUNGS: Patchy atelectasis and/or infiltrate in the lower lobes.        AIRWAY: Endotracheal tube is in place. Secretions identified in the airway. PLEURA: Normal, with no effusion or pneumothorax. MEDIASTINUM: Normal heart size. No pericardial effusion. Vasculature is   unremarkable. LYMPH NODES: No enlarged lymph nodes. OTHER: None.       ===============       ABDOMEN/PELVIS:       LIVER, BILIARY: Liver is unremarkable. No biliary dilation. Gallbladder is   unremarkable. PANCREAS: Unremarkable. SPLEEN: Unremarkable. ADRENALS: Unremarkable. KIDNEYS/URETERS/BLADDER: Unremarkable. PELVIC ORGANS: Unremarkable. LYMPH NODES: No enlarged lymph nodes. GASTROINTESTINAL TRACT: No bowel dilation or wall thickening. VASCULATURE: Unremarkable. BONES: No acute or aggressive osseous abnormalities identified. OTHER: None.       _______________           07/04/22 0740  CT SPINE CERV WO CONT Final result    Impression:      1. No cervical spine fracture. Narrative:  EXAM: CT cervical spine       CLINICAL INDICATION/HISTORY: Trauma, neck pain. COMPARISON: None. TECHNIQUE: Axial CT imaging of the cervical spine was performed from the skull   base to the thoracic inlet without intravenous contrast. Multiplanar reformats   were generated. One or more dose reduction techniques were used on this CT:   automated exposure control, adjustment of the mAs and/or kVp according to   patient size, and iterative reconstruction techniques. The specific techniques   used on this CT exam have been documented in the patient's electronic medical   record. Digital Imaging and Communications in Medicine (DICOM) format image data   are available to nonaffiliated external healthcare facilities or entities on a   secured, media free, reciprocally searchable basis with patient authorization   for at least a 12-month period after this study.            _______________       FINDINGS:       VERTEBRAE AND DISCS: Alignment and vertebral height is maintained with no   fracture or traumatic subluxation identified. SPINAL CANAL AND FORAMINA: Patent without significant bony canal or foramina   encroachment. PREVERTEBRAL SOFT TISSUES: No prevertebral soft tissue swelling. VISIBLE PORTIONS OF POSTERIOR FOSSA/BRAIN: Unremarkable. LUNG APICES: Clear. OTHER: None.       _______________           07/04/22 0079  CT HEAD WO CONT Final result    Impression:      1. No acute intracranial abnormalities. Narrative:  EXAM: CT head       CLINICAL INDICATION/HISTORY: Fall, seizure       COMPARISON: None. TECHNIQUE: Axial CT imaging of the head was performed without intravenous   contrast. One or more dose reduction techniques were used on this CT: automated   exposure control, adjustment of the mAs and/or kVp according to patient size,   and iterative reconstruction techniques. The specific techniques used on this   CT exam have been documented in the patient's electronic medical record. Digital   Imaging and Communications in Medicine (DICOM) format image data are available   to nonaffiliated external healthcare facilities or entities on a secured, media   free, reciprocally searchable basis with patient authorization for at least a   12-month period after this study. _______________       FINDINGS:       BRAIN AND EXTRA-AXIAL SPACES: Brain stimulator electrodes identified. No   intracranial hemorrhage, midline shift, or uncal herniation. There are no areas   of abnormal parenchymal attenuation. There are no abnormal extra-axial fluid   collections. CALVARIUM: Postsurgical changes involving right frontoparietal calvarium. SINUSES: Clear.        OTHER: None.       _______________               CXR Results  (Last 48 hours)    None          Medications given in the ED-  Medications   propofol (DIPRIVAN) 10 mg/mL infusion (35 mcg/kg/min × 81.6 kg IntraVENous Rate Change 7/4/22 5551)   levETIRAcetam (KEPPRA) 1,000 mg in 0.9% sodium chloride 100 mL IVPB (1,000 mg IntraVENous New Bag 7/4/22 0852)   midazolam (VERSED) injection 4 mg (4 mg IntraMUSCular Given 7/4/22 0645)   iopamidoL (ISOVUE 300) 61 % contrast injection 100 mL (100 mL IntraVENous Given 7/4/22 0716)   rocuronium injection 81.6 mg (81.6 mg IntraVENous Given 7/4/22 0709)   etomidate (AMIDATE) 2 mg/mL injection 24.48 mg (24.48 mg IntraVENous Given 7/4/22 0709)   opium-belladonna (B&O 15-A) 16.2-30 mg suppository 1 Suppository (1 Suppository Rectal Given 7/4/22 0817)   midazolam (VERSED) injection 5 mg (5 mg IntraVENous Given 7/4/22 0858)         Medical Decision Making   I am the first provider for this patient. I reviewed the vital signs, available nursing notes, past medical history, past surgical history, family history and social history. Vital Signs-Reviewed the patient's vital signs. Pulse Oximetry Analysis -98% on air    Cardiac Monitor:  Rate: 112 bpm  Rhythm: Tachycardia    EKG interpretation: (Preliminary)  9:43 AM    Sinus tachycardia, rate 118, nonspecific ST changes, QTC is 414. EKG read by Mendy Akers MD      Records Reviewed: NURSING NOTES AND PREVIOUS MEDICAL RECORDS    Provider Notes (Medical Decision Making):   Patient originally brought in as altered mental status secondary to a seizure but with multiple contusions abrasion found down in parking lot need to be considered for a major trauma. Patient had a breakthrough seizure upon arrival which regain control of followed by obtaining C-spine and blood sugar. Blood sugar was within reasonable limits at 216. He was sent over for scanning chest abdomen pelvis in addition to C-spine and head. He has a large stimulator within his chest and brain likely to help control his seizures. Surgery was also for head injury skull fracture intracranial hemorrhage C-spine clearance by imaging as opposed to Nexus criteria. Chest abdomen pelvis low likelihood but possible blunt trauma or injury. He was intubated for protection of airway as he returned from his imaging had another breakthrough seizure followed by choking which could not be cleared adequately by suction. As soon as this resolved he became agitated and starting a pulling at lines and hoses. At this point we elected to have him intubated not for seizure control but for airway protection. Large 8-0 was placed and patient was given propofol drip with added sedation. Procedures:  Intubation    Date/Time: 7/4/2022 7:23 AM  Performed by: Nell Kirkland MD  Authorized by: Nell Kirkland MD     Consent:     Consent obtained:  Emergent situation  Pre-procedure details:     Patient status:  Unresponsive    Pretreatment meds: etomidate. Paralytics:  Rocuronium  Procedure details:     Preoxygenation:  Nonrebreather mask    CPR in progress: no      Intubation method:  Oral    Oral intubation technique:  Direct    Laryngoscope blade: Mac 4    Tube size (mm):  8.0    Tube type:  Cuffed    Number of attempts:  1    Cricoid pressure: no      Tube visualized through cords: yes    Placement assessment:     ETT to teeth:  24    Tube secured with:  ETT pathak    Breath sounds:  Equal and absent over the epigastrium    Placement verification: chest rise, condensation, CXR verification, direct visualization, equal breath sounds, ETCO2 detector and tube exhalation      Placement verification comment:  CT confirmation  Post-procedure details:     Patient tolerance of procedure: Tolerated well, no immediate complications        ED Course:   7:03 AM: Initial assessment performed. The patients presenting problems have been discussed, and they are in agreement with the care plan formulated and outlined with them. I have encouraged them to ask questions as they arise throughout their visit.     CONSULT NOTE:   8:35 AM   Klever Tong MD   spoke with Dr. Edwardo Mclain  Specialty: Neurology  Discussed pt's hx, disposition, and available diagnostic and imaging results  over the telephone. Reviewed care plans. Consulting physician agrees with plans as outlined. Will accept patient. Recommends that we continue him on his home medications while using forms of sedation. His home medications are clonidine 0.5 mg nightly. Depakote 1000 mg twice daily. Zonegran 300 mg daily. .      CONSULT NOTE:   9:12 AM  Delicia Gregg MD   spoke with Dr. Mika Barajas  Specialty:  critical care  Discussed pt's hx, disposition, and available diagnostic and imaging results  over the telephone. Reviewed care plans. Consulting physician agrees with plans as outlined. We will accept patient to the ICU for management of airway protection. Discussed patient's issues as far as hypoxia probable treatment for aspiration. .      CONSULT NOTE:   9:12 AM  Delicia Gregg MD   spoke with Dr. Tanmay Sewell  Specialty: Hospitalist family medicine  Discussed pt's hx, disposition, and available diagnostic and imaging results  over the telephone. Reviewed care plans. Consulting physician agrees with plans as outlined. We will accept patient to the ICU in conjunction with management by Dr. Vinicius Miramontes as well as Dr. Rosita Cyr. Diagnosis and Disposition     Critical Care Time: 95 minutes  CRITICAL CARE NOTE:  10:22 AM  I have spent 95 minutes of critical care time involved in lab review, consultations with specialist, family decision-making, and documentation. During this entire length of time I was immediately available to the patient. Critical Care: The reason for providing this level of medical care for this critically ill patient was due a critical illness that impaired one or more vital organ systems such that there was a high probability of imminent or life threatening deterioration in the patients condition.  This care involved high complexity decision making to assess, manipulate, and support vital system functions, to treat this degreee vital organ system failure and to prevent further life threatening deterioration of the patients condition. Core Measures:  For Hospitalized Patients:    1. Hospitalization Decision Time:  The decision to hospitalize the patient was made by Harshil Combs MD   at 6:45 AM on 7/4/2022    2. Aspirin: Aspirin was not given because the patient is a bleeding risk    10:01 AM  Patient is being admitted to the hospital by Dr. Rochelle Woods. The results of their tests and reasons for their admission have been discussed with them and/or available family. They convey agreement and understanding for the need to be admitted and for their admission diagnosis. CONDITIONS ON ADMISSION:  Sepsis is not present at the time of admission. Deep Vein Thrombosis is not present at the time of admission. Thrombosis is not present at the time of admission. Urinary Tract Infection is not present at the time of admission. Pneumonia is not present at the time of admission. MRSA is not present at the time of admission. Wound infection is not present at the time of admission. Pressure Ulcer is not present at the time of admission. CLINICAL IMPRESSION:    1. Status epilepticus ([address]    2. Closed head injury, initial encounter    3. Abrasion of left wrist, initial encounter        _______________________________    This note was partially transcribed via voice recognition software. Although efforts have been made to catch any discrepancies, it may contain sound alike words, grammatical errors, or nonsensical words.          - - - - - - - - - - - - - - - -    Addendum made above to add ETT procedure note  Harshil Combs MD

## 2022-07-04 NOTE — ED NOTES
Skin tear on left posterior hand was cleansed with sterile water, dry dressing was applied. Abrasion on right eyebrow was cleansed with sterile water.

## 2022-07-04 NOTE — PROGRESS NOTES
Repeat ABG on ventilator showed improved pH and PCO2. Continue current ventilator settings. KUB reviewed: NGT below diaphragm.   Carolee Winslow MD 7/4/2022 12:24 PM

## 2022-07-04 NOTE — PROGRESS NOTES
Patient intubated and placed on ventilator. Taken to CT and back to ER.  Time spent with patient 75 minutes

## 2022-07-04 NOTE — PROGRESS NOTES
TRANSFER - IN REPORT:    Verbal report received from Jan RN(name) on Dg Barnes  being received from ER(unit) for routine progression of care      Report consisted of patients Situation, Background, Assessment and   Recommendations(SBAR). Information from the following report(s) SBAR, Kardex, Recent Results and Cardiac Rhythm ST was reviewed with the receiving nurse. Opportunity for questions and clarification was provided. Assessment completed upon patients arrival to unit and care assumed.

## 2022-07-04 NOTE — CONSULTS
[facility]  Pulmonary, Critical Care, and Sleep Medicine    Name: Mariana Sánchez MRN: [id number]   : 1995 Hospital: [facility] [place]    Date: 2022  Room: ER/[address] [place] [address] Note                                              Consult requesting physician: Dr. Emaline Simmonds  Reason for Consult: Ventilator management, status epilepticus    IMPRESSION:       DONOVAN/Gerardo Mohr 1106 Problems    Diagnosis Date Noted    Status epilepticus ([facility] 2022    Respiratory failure ([address] 2022    Marijuana use 2022    Hypernatremia 2022   ·      Patient Active Problem List   Diagnosis Code    Status epilepticus ([facility] G40.901    Respiratory failure ([facility] J96.90    Marijuana use F12.90    Hypernatremia E87.0         · Code status: Full code      RECOMMENDATIONS:     Respiratory: Intubated for airway protection due to multiple seizures, status epilepticus. History of asthma and smoker. Bronchodilators: Albuterol as needed. CXR: ETT in place. Bibasilar mild atelectasis. CT chest abdomen pelvis 2022: Nil acute. Bibasilar atelectasis. ABG with respiratory acidosis; will repeat ABG and adjust ventilator accordingly. Currently on assist control ventilator; will adjust after ABG. Sedation: Currently on propofol drip in ER; will change to Versed drip. Versed/fentanyl as needed. When seizure controlled, may consider stopping sedation and evaluate for SBT tomorrow morning. Ventilator bundle & Sedation protocol followed. Daily sedation holiday, assessment for readiness for SBT and then re-titrate if required. Chlorhexidine mouth washes. Keep SPO2 >=92%. HOB 30 degree elevation all the time. Aggressive pulmonary toileting. Aspiration precautions. CVS:   Blood pressure mildly elevated; monitor. ID:   Thought to have mild aspiration; but no aspiration noted at the time of intubation per Dr. Emaline Simmonds. Received clindamycin x1 in ER. No fever or leukocytosis.   No signs of infection. Observe off antibiotics. Hematology/Oncology: Normal hemoglobin, platelets and coags. Monitor. Renal:   Mildly elevated creatinine; will check CPK with seizure. Mild hypernatremia, expected to improve with IVF. GI/: No acute issues. Endocrine: Monitor BS. SSI. Neurology: History of seizure with recurrent hospitalization; last [facility] hospitalization 04/2022. Multiple seizures/status epilepticus. CTA head and neck nil acute. Received Keppra in ER. Currently on propofol drip; will change to Versed drip. Will defer antiseizure medications to neurologist.  Neurology Dr. Grover Mendoza consulted from ER. Psychiatry: No acute issues. Toxicology: Urine drug screen positive for THC and benzo; patient received benzo before drug screen test.    Pain/Sedation: Sedation protocol as above. Skin/Wound: Contusion on left eyebrow and abrasion on the left upper extremity. Electrolytes: Replace electrolytes per ICU electrolyte replacement protocol. IVF: NS at 100 mL/h. Nutrition: N.p.o.    Prophylaxis: DVT Prophylaxis: SCD/Lovenox. GI Prophylaxis: Protonix. Restraints:   Wrist soft restraints for patient interfering with medical therapy/management and patient safety. Lines/Tubes: PIV  ETT: 7/4/2022. Contreras: To be placed 7/4/2022 (Medically necessary for strict input/output monitoring in critically ill patient, will remove it when not needed. Contreras bundle followed). Advance Directive/Palliative Care: Per clinical course. Will defer respective systems problem management to primary and other respective consultant and follow patient in ICU with primary and other medical team.  Further recommendations will be based on the patient's response to recommended treatment and results of the investigation ordered. Quality Care: PPI, DVT prophylaxis, HOB elevated, Infection control all reviewed and addressed.     Care of plan d/w RN, RT, MDR, Dr. Winslow Head, Dr. Janelle Clark complexity decision making was performed during the evaluation of this patient at high risk for decompensation with multiple organ involvement. Total critical care time spent rendering care exclusive of procedures/family discussion/coordination of care: 45 minutes. Subjective/History of Present Illness:     Patient is a 33 y.o. male with PMHx significant for asthma, seizure, s/p vagal nerve stimulator, smoker, polysubstance abuse; being admitted for status epilepticus. Patient was found unresponsive by paramedics; head to seizure episodes in ER; received Keppra and benzo and intubated for airway protection due to status epilepticus. Recent [place] hospitalization for seizure in 04/2022, and at [facility] in 09/2021.    7/4/2022 :   Seen in ER room #6. Intubated and sedated with propofol drip. Received Keppra in ER. Neurology consulted from ER. No fever. Blood pressure mildly elevated. Received clinda for questionable aspiration; but Dr. Flannery Sheets did not show any secretions while intubating the patient and CXR is not suggestive of pneumonia. No vomiting or diarrhea. I/O last 24 hrs: No intake or output data in the 24 hours ending 07/04/22 1044      The patient is critically ill and can not provide additional history due to Ventilated    History taken from EMR     Review of Systems:  ROS not obtained due to patient factor. No Known Allergies   Past Medical History:   Diagnosis Date    Seizure ([address]       No past surgical history on file. Social History     Tobacco Use    Smoking status: Not on file    Smokeless tobacco: Not on file   Substance Use Topics    Alcohol use: Not on file      No family history on file. Prior to Admission medications    Medication Sig Start Date End Date Taking? Authorizing Provider   carBAMazepine (TEGretoL) 200 mg tablet Take 200 mg by mouth three (3) times daily.     Other, MD Ángela     Current Facility-Administered Medications   Medication Dose Route Frequency    propofol (DIPRIVAN) 10 mg/mL infusion  0-50 mcg/kg/min IntraVENous TITRATE    midazolam in normal saline (VERSED) 1 mg/mL infusion  0-10 mg/hr IntraVENous TITRATE    propofoL (DIPRIVAN) 10 mg/mL injection             Objective:   Vital Signs:    Visit Vitals  BP (!) 140/67   Pulse 95   Temp 98 °F (36.7 °C)   Resp 24   Wt 81.6 kg (179 lb 14.3 oz)   SpO2 100%   BMI 29.04 kg/m²               Temp (24hrs), Av °F (36.7 °C), Min:98 °F (36.7 °C), Max:98 °F (36.7 °C)       Intake/Output:   Last shift:      No intake/output data recorded. Last 3 shifts: No intake/output data recorded. No intake or output data in the 24 hours ending 22 1044    Last 3 Recorded Weights in this Encounter    22 0706   Weight: 81.6 kg (179 lb 14.3 oz)         Ventilator Settings:  Mode Rate Tidal Volume Pressure FiO2 PEEP   Assist control   450 ml    100 % 5 cm H20     Peak airway pressure:      Plateau pressure:     Tidal volume:    Minute ventilation:       Recent Labs     22  0804   PHI 7.18*   PCO2I 53.9*   PO2I 136*   HCO3I 20.3*   FIO2I 50       Physical Exam:     General/Neurology: Intubated, on ventilator, sedated. Moving all 4 extremities spontaneously. Head:   Normocephalic, without obvious abnormality, atraumatic. Eye:   Pupils bilateral equal and reactive to light. No scleral icterus, no pallor, no cyanosis. Nose:   No sinus tenderness  Throat:  Lips, mucosa, and tongue normal. No oral thrush. Neck:   Supple, symmetric. No lymphadenopathy. Trachea midline  Lung: Moderate air entry bilateral equal. No rales. No rhonchi. No wheezing. No stridors. No prolongded expiration. No accessory muscle use. Heart:   Regular rate & rhythm. S1 S2 present. No murmur. No JVD. Abdomen:  Soft. NT. ND. +BS. No masses. Extremities:  No pedal edema. No cyanosis. No clubbing. Pulses: 2+ and symmetric in DP.  Capillary refill: normal  Lymphatic:  No cervical or supraclavicular palpable lymphadenopathy. Musculoskeletal: No joint swelling. No tenderness. Skin:   Color, texture, turgor normal. No rashes or lesions. Data:       Recent Results (from the past 24 hour(s))   CBC WITH AUTOMATED DIFF    Collection Time: 07/04/22  6:30 AM   Result Value Ref Range    WBC 10.7 4.6 - 13.2 K/uL    RBC 4.64 4.35 - 5.65 M/uL    HGB 14.4 13.0 - 16.0 g/dL    HCT 45.7 36.0 - 48.0 %    MCV 98.5 78.0 - 100.0 FL    MCH 31.0 24.0 - 34.0 PG    MCHC 31.5 31.0 - 37.0 g/dL    RDW 11.8 11.6 - 14.5 %    PLATELET 706 296 - 898 K/uL    MPV 11.6 9.2 - 11.8 FL    NRBC 0.0 0  WBC    ABSOLUTE NRBC 0.00 0.00 - 0.01 K/uL    NEUTROPHILS 61 40 - 73 %    LYMPHOCYTES 29 21 - 52 %    MONOCYTES 10 3 - 10 %    EOSINOPHILS 0 0 - 5 %    BASOPHILS 0 0 - 2 %    IMMATURE GRANULOCYTES 1 (H) 0.0 - 0.5 %    ABS. NEUTROPHILS 6.5 1.8 - 8.0 K/UL    ABS. LYMPHOCYTES 3.1 0.9 - 3.6 K/UL    ABS. MONOCYTES 1.0 0.05 - 1.2 K/UL    ABS. EOSINOPHILS 0.0 0.0 - 0.4 K/UL    ABS. BASOPHILS 0.0 0.0 - 0.1 K/UL    ABS. IMM.  GRANS. 0.1 (H) 0.00 - 0.04 K/UL    DF AUTOMATED     METABOLIC PANEL, BASIC    Collection Time: 07/04/22  6:30 AM   Result Value Ref Range    Sodium 146 (H) 136 - 145 mmol/L    Potassium 4.0 3.5 - 5.5 mmol/L    Chloride 113 (H) 100 - 111 mmol/L    CO2 13 (L) 21 - 32 mmol/L    Anion gap 20 (H) 3.0 - 18 mmol/L    Glucose 205 (H) 74 - 99 mg/dL    BUN 19 (H) 7.0 - 18 MG/DL    Creatinine 1.51 (H) 0.6 - 1.3 MG/DL    BUN/Creatinine ratio 13 12 - 20      GFR est AA >60 >60 ml/min/1.73m2    GFR est non-AA 56 (L) >60 ml/min/1.73m2    Calcium 9.8 8.5 - 10.1 MG/DL   PTT    Collection Time: 07/04/22  6:30 AM   Result Value Ref Range    aPTT 25.0 23.0 - 36.4 SEC   PROTHROMBIN TIME + INR    Collection Time: 07/04/22  6:30 AM   Result Value Ref Range    Prothrombin time 14.3 11.5 - 15.2 sec    INR 1.1 0.8 - 1.2     ETHYL ALCOHOL    Collection Time: 07/04/22  6:30 AM   Result Value Ref Range    ALCOHOL(ETHYL),SERUM <3 0 - 3 MG/DL   GLUCOSE, POC Collection Time: 07/04/22  6:42 AM   Result Value Ref Range    Glucose (POC) 216 (H) 70 - 110 mg/dL   URINALYSIS W/ RFLX MICROSCOPIC    Collection Time: 07/04/22  7:50 AM   Result Value Ref Range    Color YELLOW      Appearance CLEAR      Specific gravity 1.030 1.005 - 1.030      pH (UA) 5.5 5.0 - 8.0      Protein 30 (A) NEG mg/dL    Glucose 500 (A) NEG mg/dL    Ketone Negative NEG mg/dL    Bilirubin Negative NEG      Blood TRACE (A) NEG      Urobilinogen 0.2 0.2 - 1.0 EU/dL    Nitrites Negative NEG      Leukocyte Esterase Negative NEG     DRUG SCREEN, URINE    Collection Time: 07/04/22  7:50 AM   Result Value Ref Range    BENZODIAZEPINES Positive (A) NEG      BARBITURATES Negative NEG      THC (TH-CANNABINOL) Positive (A) NEG      OPIATES Negative NEG      PCP(PHENCYCLIDINE) Negative NEG      COCAINE Negative NEG      AMPHETAMINES Negative NEG      METHADONE Negative NEG      HDSCOM (NOTE)    URINE MICROSCOPIC ONLY    Collection Time: 07/04/22  7:50 AM   Result Value Ref Range    WBC Negative 0 - 5 /hpf    RBC 0 to 3 0 - 5 /hpf    Epithelial cells FEW 0 - 5 /lpf    Bacteria Negative NEG /hpf    Hyaline cast 0 to 3 0 - 2 /lpf   BLOOD GAS, ARTERIAL POC    Collection Time: 07/04/22  8:04 AM   Result Value Ref Range    Device: ADULT VENT      FIO2 (POC) 50 %    pH (POC) 7.18 (LL) 7.35 - 7.45      pCO2 (POC) 53.9 (H) 35.0 - 45.0 MMHG    pO2 (POC) 136 (H) 80 - 100 MMHG    HCO3 (POC) 20.3 (L) 22 - 26 MMOL/L    sO2 (POC) 98.2 (H) 92 - 97 %    Base deficit (POC) 8.4 mmol/L    Mode ASSIST CONTROL      Tidal volume 450 ml    Set Rate 18 bpm    PEEP/CPAP (POC) 5 cmH2O    Allens test (POC) Positive      Site LEFT BRACHIAL      Specimen type (POC) ARTERIAL      Performed by [facility]    EKG, 12 LEAD, INITIAL    Collection Time: 07/04/22  9:43 AM   Result Value Ref Range    Ventricular Rate 118 BPM    Atrial Rate 118 BPM    P-R Interval 142 ms    QRS Duration 84 ms    Q-T Interval 296 ms    QTC Calculation (Bezet) 414 ms Calculated P Axis 44 degrees    Calculated R Axis 86 degrees    Calculated T Axis 32 degrees    Diagnosis       Sinus tachycardia  Otherwise normal ECG  No previous ECGs available           Chemistry Recent Labs     07/04/22  0630   *   *   K 4.0   *   CO2 13*   BUN 19*   CREA 1.51*   CA 9.8   AGAP 20*   BUCR 13        Lactic Acid No results found for: LAC  No results for input(s): LAC in the last 72 hours. Liver Enzymes Protein, total   Date Value Ref Range Status   11/22/2016 7.4 6.4 - 8.2 g/dL Final     Albumin   Date Value Ref Range Status   11/22/2016 4.3 3.4 - 5.0 g/dL Final     Globulin   Date Value Ref Range Status   11/22/2016 3.1 2.0 - 4.0 g/dL Final     A-G Ratio   Date Value Ref Range Status   11/22/2016 1.4 0.8 - 1.7   Final     Alk. phosphatase   Date Value Ref Range Status   11/22/2016 104 45 - 117 U/L Final     No results for input(s): TP, ALB, GLOB, AGRAT, AP, TBIL in the last 72 hours.     No lab exists for component: SGOT, GPT, DBIL     CBC w/Diff Recent Labs     07/04/22  0630   WBC 10.7   RBC 4.64   HGB 14.4   HCT 45.7      GRANS 61   LYMPH 29   EOS 0        Cardiac Enzymes No results found for: CPK, CK, CKMMB, CKMB, RCK3, CKMBT, CKNDX, CKND1, ANDRÉS, TROPT, TROIQ, ADAM, TROPT, TNIPOC, BNP, BNPP     BNP No results found for: BNP, BNPP, XBNPT     Coagulation Recent Labs     07/04/22  0630   PTP 14.3   INR 1.1   APTT 25.0         Thyroid  No results found for: T4, T3U, TSH, TSHEXT, TSHEXT    No results found for: T4     Urinalysis Lab Results   Component Value Date/Time    Color YELLOW 07/04/2022 07:50 AM    Appearance CLEAR 07/04/2022 07:50 AM    Specific gravity 1.030 07/04/2022 07:50 AM    pH (UA) 5.5 07/04/2022 07:50 AM    Protein 30 (A) 07/04/2022 07:50 AM    Glucose 500 (A) 07/04/2022 07:50 AM    Ketone Negative 07/04/2022 07:50 AM    Bilirubin Negative 07/04/2022 07:50 AM    Urobilinogen 0.2 07/04/2022 07:50 AM    Nitrites Negative 07/04/2022 07:50 AM    Leukocyte Esterase Negative 07/04/2022 07:50 AM    Epithelial cells FEW 07/04/2022 07:50 AM    Bacteria Negative 07/04/2022 07:50 AM    WBC Negative 07/04/2022 07:50 AM    RBC 0 to 3 07/04/2022 07:50 AM        Micro  No results for input(s): SDES, CULT in the last 72 hours. No results for input(s): CULT in the last 72 hours. Culture data during this hospitalization. All Micro Results     None             CT HEAD WO CONT    Result Date: 7/4/2022  EXAM: CT head CLINICAL INDICATION/HISTORY: Fall, seizure COMPARISON: None. TECHNIQUE: Axial CT imaging of the head was performed without intravenous contrast. One or more dose reduction techniques were used on this CT: automated exposure control, adjustment of the mAs and/or kVp according to patient size, and iterative reconstruction techniques. The specific techniques used on this CT exam have been documented in the patient's electronic medical record. Digital Imaging and Communications in Medicine (DICOM) format image data are available to nonaffiliated external healthcare facilities or entities on a secured, media free, reciprocally searchable basis with patient authorization for at least a 12-month period after this study. _______________ FINDINGS: BRAIN AND EXTRA-AXIAL SPACES: Brain stimulator electrodes identified. No intracranial hemorrhage, midline shift, or uncal herniation. There are no areas of abnormal parenchymal attenuation. There are no abnormal extra-axial fluid collections. CALVARIUM: Postsurgical changes involving right frontoparietal calvarium. SINUSES: Clear. OTHER: None. _______________     1. No acute intracranial abnormalities. CT CHEST ABD PELV W CONT    Result Date: 7/4/2022  EXAM: CT of the chest, abdomen, and pelvis CLINICAL INDICATION/HISTORY: Seizure, chest pain, abdominal pain COMPARISON: None. TECHNIQUE: Axial CT imaging of the chest, abdomen, and pelvis was performed with intravenous contrast. Multiplanar reformats were generated.  One or more dose reduction techniques were used on this CT: automated exposure control, adjustment of the mAs and/or kVp according to patient size, and iterative reconstruction techniques. The specific techniques used on this CT exam have been documented in the patient's electronic medical record. Digital Imaging and Communications in Medicine (DICOM) format image data are available to nonaffiliated external healthcare facilities or entities on a secured, media free, reciprocally searchable basis with patient authorization for at least a 12-month period after this study. _______________ FINDINGS: CHEST: LUNGS: Patchy atelectasis and/or infiltrate in the lower lobes. AIRWAY: Endotracheal tube is in place. Secretions identified in the airway. PLEURA: Normal, with no effusion or pneumothorax. MEDIASTINUM: Normal heart size. No pericardial effusion. Vasculature is unremarkable. LYMPH NODES: No enlarged lymph nodes. OTHER: None. =============== ABDOMEN/PELVIS: LIVER, BILIARY: Liver is unremarkable. No biliary dilation. Gallbladder is unremarkable. PANCREAS: Unremarkable. SPLEEN: Unremarkable. ADRENALS: Unremarkable. KIDNEYS/URETERS/BLADDER: Unremarkable. PELVIC ORGANS: Unremarkable. LYMPH NODES: No enlarged lymph nodes. GASTROINTESTINAL TRACT: No bowel dilation or wall thickening. VASCULATURE: Unremarkable. BONES: No acute or aggressive osseous abnormalities identified. OTHER: None. _______________     1. No traumatic injury identified. 2. Bilateral lower lobe opacities could relate to atelectasis, infiltrate, and/or aspiration. CT SPINE CERV WO CONT    Result Date: 7/4/2022  EXAM: CT cervical spine CLINICAL INDICATION/HISTORY: Trauma, neck pain. COMPARISON: None. TECHNIQUE: Axial CT imaging of the cervical spine was performed from the skull base to the thoracic inlet without intravenous contrast. Multiplanar reformats were generated.  One or more dose reduction techniques were used on this CT: automated exposure control, adjustment of the mAs and/or kVp according to patient size, and iterative reconstruction techniques. The specific techniques used on this CT exam have been documented in the patient's electronic medical record. Digital Imaging and Communications in Medicine (DICOM) format image data are available to nonaffiliated external healthcare facilities or entities on a secured, media free, reciprocally searchable basis with patient authorization for at least a 12-month period after this study. _______________ FINDINGS: VERTEBRAE AND DISCS: Alignment and vertebral height is maintained with no fracture or traumatic subluxation identified. SPINAL CANAL AND FORAMINA: Patent without significant bony canal or foramina encroachment. PREVERTEBRAL SOFT TISSUES: No prevertebral soft tissue swelling. VISIBLE PORTIONS OF POSTERIOR FOSSA/BRAIN: Unremarkable. LUNG APICES: Clear. OTHER: None. _______________     1. No cervical spine fracture. XR CHEST PORT    Result Date: 7/4/2022  EXAM: CHEST RADIOGRAPH CLINICAL INDICATION/HISTORY: pain   > Additional: Chest pain COMPARISON: None TECHNIQUE: Portable frontal view of the chest _______________ FINDINGS: SUPPORT DEVICES: Endotracheal tube is in good position. HEART AND MEDIASTINUM: Heart size is normal. LUNGS AND PLEURAL SPACES: Bilateral basilar atelectasis and or infiltrate. No pleural effusion or pneumothorax. BONES AND SOFT TISSUES: Stimulator generator projects over the left chest. _______________     1. Endotracheal tube is in good position. 2. Bilateral basilar atelectasis and/or infiltrate. Images report reviewed by me:  CT (Most Recent) (CT chest reviewed by me) Results from Hospital Encounter encounter on 07/04/22    CT CHEST ABD PELV W CONT    Narrative  EXAM: CT of the chest, abdomen, and pelvis    CLINICAL INDICATION/HISTORY: Seizure, chest pain, abdominal pain    COMPARISON: None.     TECHNIQUE: Axial CT imaging of the chest, abdomen, and pelvis was performed with  intravenous contrast. Multiplanar reformats were generated. One or more dose  reduction techniques were used on this CT: automated exposure control,  adjustment of the mAs and/or kVp according to patient size, and iterative  reconstruction techniques. The specific techniques used on this CT exam have  been documented in the patient's electronic medical record. Digital Imaging and  Communications in Medicine (DICOM) format image data are available to  nonaffiliated external healthcare facilities or entities on a secured, media  free, reciprocally searchable basis with patient authorization for at least a  12-month period after this study. _______________    FINDINGS:    CHEST:    LUNGS: Patchy atelectasis and/or infiltrate in the lower lobes. AIRWAY: Endotracheal tube is in place. Secretions identified in the airway. PLEURA: Normal, with no effusion or pneumothorax. MEDIASTINUM: Normal heart size. No pericardial effusion. Vasculature is  unremarkable. LYMPH NODES: No enlarged lymph nodes. OTHER: None.    ===============    ABDOMEN/PELVIS:    LIVER, BILIARY: Liver is unremarkable. No biliary dilation. Gallbladder is  unremarkable. PANCREAS: Unremarkable. SPLEEN: Unremarkable. ADRENALS: Unremarkable. KIDNEYS/URETERS/BLADDER: Unremarkable. PELVIC ORGANS: Unremarkable. LYMPH NODES: No enlarged lymph nodes. GASTROINTESTINAL TRACT: No bowel dilation or wall thickening. VASCULATURE: Unremarkable. BONES: No acute or aggressive osseous abnormalities identified. OTHER: None.    _______________    Impression  1. No traumatic injury identified. 2. Bilateral lower lobe opacities could relate to atelectasis, infiltrate,  and/or aspiration. CXR reviewed by me:  XR (Most Recent).  CXR  reviewed by me and compared with previous CXR Results from Hospital Encounter encounter on 07/04/22    XR CHEST PORT    Narrative  EXAM: CHEST RADIOGRAPH    CLINICAL INDICATION/HISTORY: pain  > Additional: Chest pain    COMPARISON: None    TECHNIQUE: Portable frontal view of the chest    _______________    FINDINGS:    SUPPORT DEVICES: Endotracheal tube is in good position. HEART AND MEDIASTINUM: Heart size is normal.    LUNGS AND PLEURAL SPACES: Bilateral basilar atelectasis and or infiltrate. No  pleural effusion or pneumothorax. BONES AND SOFT TISSUES: Stimulator generator projects over the left chest.    _______________    Impression  1. Endotracheal tube is in good position. 2. Bilateral basilar atelectasis and/or infiltrate. ·Please note: Voice-recognition software may have been used to generate this report, which may have resulted in some phonetic-based errors in grammar and contents. Even though attempts were made to correct all the mistakes, some may have been missed, and remained in the body of the document.       Reema Basilio MD  7/4/2022

## 2022-07-04 NOTE — ED NOTES
Pt belongings were checked by nurse. $3 was noted in pt wallet, 2 credit card, pt state ID, and social security card. Nurse also noted 2 silver chain, 1 apple watch, 1 iphone, 1 medical bracelet ( epilepsy ), and keys. Pt shirt, pants and shoes were place in a bag along with the other items listed above.

## 2022-07-04 NOTE — ED NOTES
AxOx4 pt arrived for chief complaint of seizure. [facility] Medic 9 reports finding pt in the road way. The seizure was unwitnessed and the pt appeared postictal on arrival.  Pt was taken to bed 6 on arrival.  The pt then began seizing on arrival to ED.  4mg of versed was administered IM and Dr Thedford Ormond was paged to the room. Pt clothing removed and assessed for injuries. Injuries include hematoma to left eye, abrasion to left wrist.    Signs and symptoms: postictal/full tonic clonic seizure    Allergies: unknown    Medication: listed in medication section    PMH: seizure, sleeping walking, vagus nerve stimulator    Last oral intake: unknown    Event leading up to ED visit: Pt was found down in the middle of the road way. Pt breath adequately with equal chest rise and fall. IV established and flushed for patency bilaterally by ED staff    Pt placed on cardiac monitor    Pt recognized as a potential trauma do to being found in the roadway with a potential head injury. Pt became combative of seizure like activity. Pt was then intubated for airway protection. 8.0 Tube established with DV, ETCO2 confirmed and bilateral chest rise and fall was seen. Pt started on propofol post intubation. Pt then taken to CT and Report was given to Pablo Rice RN.

## 2022-07-04 NOTE — H&P
History & Physical    Patient: Marian Rutledge MRN: [id number]  CSN: [id number]    YOB: 1995  Age: 33 y.o. Sex: male      DOA: 7/4/2022  Primary Care Provider:  Jaleel Huerta MD      Assessment/Plan     Patient Active Problem List   Diagnosis Code    Status epilepticus ([facility] [address] G40.901    Respiratory failure ([facility] [address] J96.90    Marijuana use F12.90    Hypernatremia E87.0       Admit to ICU    CRITICAL CARE PLAN    Resp -   Respiratory failure -  Intubated for airway protection  See vent orders, VAP bundle. HOB>30 degrees. Follow up ABG with improvement in respiratory acidosis. ID -   Concern for aspiration in ER  Received clindamycin  Monitor off antibiotics    CVS - Monitor HD. Heme/onc - Follow H&H, plts. INR. Renal - Trend BUN, Cr, follow I/O, hay in place. Check and replace Mg, K, phos. Endocrine -  Follow FSG    Neuro/ Pain/ Sedation -   Status epilepticus -  Neurology following  Had seizure again after admission  On vimpat, dilantin, depacon. Sedation - propofol, versed, fentanyl. His urine drug screen positive for THC, benzodiazepine. GI - NPO for now. NG tube, tube feeding. Prophylaxis - DVT:lovenox, GI: protonix    [id number]  35 minutes of critical care time spent in the direct evaluation and treatment of this high risk patient. The reason for providing this level of medical care for this critically ill patient was due a critical illness that impaired one or more vital organ systems such that there was a high probability of imminent or life threatening deterioration in the patients condition. This care involved high complexity decision making to assess, manipulate, and support vital system functions, to treat this degreee vital organ system failure and to prevent further life threatening deterioration of the patients condition.            Estimated length of stay : 2-3 days    CC: seizures       HPI:     Dg Moody is a 33 y.o. male with seizure disorder, status post vagus nerve stimulator placement, asthma is brought to ER by EMS as he was found unresponsive by paramedics. Patient is orally intubated, sedated. History obtained from chart review. Per reports he was found unresponsive in a parking lot. Paramedics found him postictal.  He was noted to have large abrasion of his left upper arm, injury right eyebrow. He was brought to ER and he had witnessed episode in the emergency room. His blood sugar was noted to be 216. He was intubated to protect his airway. He has [facility] admissions [facility] for status epilepticus/breakthrough seizures. Past Medical History:   Diagnosis Date    Seizure [facility])        No past surgical history on file. No family history on file. Social History     Socioeconomic History    Marital status: SINGLE       Prior to Admission medications    Medication Sig Start Date End Date Taking? Authorizing Provider   carBAMazepine (TEGretoL) 200 mg tablet Take 200 mg by mouth three (3) times daily. Other, MD Ángeal       No Known Allergies    Review of Systems  Unable to obtain          Physical Exam:     Physical Exam:  Visit Vitals  /88   Pulse 74   Temp 98 °F (36.7 °C)   Resp 20   Wt 81.6 kg (179 lb 14.3 oz)   SpO2 100%   BMI 29.04 kg/m²           Temp (24hrs), Av °F (36.7 °C), Min:98 °F (36.7 °C), Max:98 °F (36.7 °C)    No intake/output data recorded. No intake/output data recorded. General:   As above. Head:  Normocephalic, without obvious abnormality, atraumatic. Eyes:  Conjunctivae/corneas clear, sclera anicteric, PERRL, EOMs intact. Nose: Nares normal. No drainage or sinus tenderness. Throat: Lips, mucosa, and tongue normal.    Neck: Supple, symmetrical, trachea midline, no adenopathy. Lungs:   Clear to auscultation bilaterally. Heart:   S1, S2, no murmur, click, rub or gallop. Abdomen: Soft, non-tender. Bowel sounds normal. No masses,  No organomegaly.    Extremities: Extremities normal, atraumatic, no cyanosis or edema. Capillary refill normal.   Pulses: 2+ and symmetric all extremities. Skin: Skin color pink, turgor normal. No rashes or lesions   Neurologic:  Not tested       Labs Reviewed:    CMP:   Lab Results   Component Value Date/Time     (H) 07/04/2022 06:30 AM    K 4.0 07/04/2022 06:30 AM     (H) 07/04/2022 06:30 AM    CO2 13 (L) 07/04/2022 06:30 AM    AGAP 20 (H) 07/04/2022 06:30 AM     (H) 07/04/2022 06:30 AM    BUN 19 (H) 07/04/2022 06:30 AM    CREA 1.51 (H) 07/04/2022 06:30 AM    GFRAA >60 07/04/2022 06:30 AM    GFRNA 56 (L) 07/04/2022 06:30 AM    CA 9.8 07/04/2022 06:30 AM     CBC:   Lab Results   Component Value Date/Time    WBC 10.7 07/04/2022 06:30 AM    HGB 14.4 07/04/2022 06:30 AM    HCT 45.7 07/04/2022 06:30 AM     07/04/2022 06:30 AM     All Cardiac Markers in the last 24 hours:   Lab Results   Component Value Date/Time     (H) 07/04/2022 06:30 AM         Procedures/imaging: see electronic medical records for all procedures/Xrays and details which were not copied into this note but were reviewed prior to creation of Plan    Please note that this dictation was completed with Fengxiafei, the [employer] voice recognition software. Quite often unanticipated grammatical, syntax, homophones, and other interpretive errors are inadvertently transcribed by the computer software. Please disregard these errors. Please excuse any errors that have escaped final proofreading.         CC: Guanaco Dotson MD

## 2022-07-04 NOTE — PROGRESS NOTES
attempted to visit patient, however he was vented. There was no family present or in the ED waiting area. Chaplains remain available to provide pastoral support as needed and requested. .  Shayy Vaughn,Certified [employer] Advance Care   Coordinator Anton Ruiz  [phone]

## 2022-07-04 NOTE — ED NOTES
TRANSFER - OUT REPORT:    Verbal report given to [address] (name) on [facility]  being transferred to ICU (unit) for routine progression of care       Report consisted of patients Situation, Background, Assessment and   Recommendations(SBAR). Information from the following report(s) SBAR, ED Summary, MAR, Recent Results and Cardiac Rhythm Sinus Tachy Otherwise NSR  was reviewed with the receiving nurse. Lines:   Peripheral IV 07/04/22 Right (Active)       Peripheral IV 07/04/22 Left Antecubital (Active)        Opportunity for questions and clarification was provided.       Patient transported with:   Registered Nurse  Tech

## 2022-07-04 NOTE — PROCEDURES
ELECTROENCEPHALOGRAPHY     Patient: Lucia Enriquez MRN: [id number]  CSN: [id number]    YOB: 1995  Age: 33 y.o. Sex: male    DOA: 7/4/2022 LOS:  LOS: 0 days        Date of Service: 7/4/2022    DICTATING: Logan Dale MD     REFERRING PHYSICIAN: Dr. Gosia Squires: 22-46    HISTORY OF PRESENT ILLNESS: This is a 81-year-old male, who presented with status epilepticus. This EEG was performed in order to assess electrodiagnostic risk factors for epilepsy. ELECTROENCEPHALOGRAM INTERPRETATION: This is a referential and bipolar EEG recorded with a 10-20 system. EEG background does not have posterior dominant rhythm. There are beta range waves throughout. Photic stimulation does not elicit any abnormal activity. There are intermittent bilateral temporal sharp waves. There are intermittent bilateral RNS activation. IMPRESSION: This is an abnormal EEG awake and drowsy. There are intermittent bilateral temporal sharp waves and bilateral RNS activation. There are no active seizures. Signed:   Logan Dale MD  7/4/2022  3:05 PM

## 2022-07-04 NOTE — ED NOTES
Pt was noted in restraints at this time. No cyanosis to fingers or palm. Good cap refill noted. Pt in intubated .

## 2022-07-05 ENCOUNTER — APPOINTMENT (OUTPATIENT)
Dept: GENERAL RADIOLOGY | Age: 27
DRG: 053 | End: 2022-07-05
Attending: INTERNAL MEDICINE
Payer: MEDICAID

## 2022-07-05 LAB
ARTERIAL PATENCY WRIST A: ABNORMAL
BASE DEFICIT BLD-SCNC: 2.5 MMOL/L
BDY SITE: ABNORMAL
BODY TEMPERATURE: 98
EST. AVERAGE GLUCOSE BLD GHB EST-MCNC: 108 MG/DL
GAS FLOW.O2 O2 DELIVERY SYS: ABNORMAL L/MIN
GAS FLOW.O2 SETTING OXYMISER: 20 BPM
GLUCOSE BLD STRIP.AUTO-MCNC: 60 MG/DL (ref 70–110)
GLUCOSE BLD STRIP.AUTO-MCNC: 61 MG/DL (ref 70–110)
GLUCOSE BLD STRIP.AUTO-MCNC: 71 MG/DL (ref 70–110)
GLUCOSE BLD STRIP.AUTO-MCNC: 73 MG/DL (ref 70–110)
GLUCOSE BLD STRIP.AUTO-MCNC: 89 MG/DL (ref 70–110)
GLUCOSE BLD STRIP.AUTO-MCNC: 90 MG/DL (ref 70–110)
HBA1C MFR BLD: 5.4 % (ref 4.2–5.6)
HCO3 BLD-SCNC: 21 MMOL/L (ref 22–26)
O2/TOTAL GAS SETTING VFR VENT: 30 %
PCO2 BLD: 31.1 MMHG (ref 35–45)
PEEP RESPIRATORY: 5 CMH2O
PH BLD: 7.44 [PH] (ref 7.35–7.45)
PO2 BLD: 97 MMHG (ref 80–100)
SAO2 % BLD: 98 % (ref 92–97)
SERVICE CMNT-IMP: ABNORMAL
SPECIMEN TYPE: ABNORMAL
VENTILATION MODE VENT: ABNORMAL
VT SETTING VENT: 450 ML

## 2022-07-05 PROCEDURE — 74011250636 HC RX REV CODE- 250/636: Performed by: PSYCHIATRY & NEUROLOGY

## 2022-07-05 PROCEDURE — 74011250636 HC RX REV CODE- 250/636: Performed by: INTERNAL MEDICINE

## 2022-07-05 PROCEDURE — 82962 GLUCOSE BLOOD TEST: CPT

## 2022-07-05 PROCEDURE — 74011250637 HC RX REV CODE- 250/637: Performed by: INTERNAL MEDICINE

## 2022-07-05 PROCEDURE — 74011000250 HC RX REV CODE- 250: Performed by: INTERNAL MEDICINE

## 2022-07-05 PROCEDURE — C9113 INJ PANTOPRAZOLE SODIUM, VIA: HCPCS | Performed by: INTERNAL MEDICINE

## 2022-07-05 PROCEDURE — 77010033678 HC OXYGEN DAILY

## 2022-07-05 PROCEDURE — 74011000258 HC RX REV CODE- 258: Performed by: PSYCHIATRY & NEUROLOGY

## 2022-07-05 PROCEDURE — 74011000258 HC RX REV CODE- 258: Performed by: INTERNAL MEDICINE

## 2022-07-05 PROCEDURE — 94003 VENT MGMT INPAT SUBQ DAY: CPT

## 2022-07-05 PROCEDURE — 82803 BLOOD GASES ANY COMBINATION: CPT

## 2022-07-05 PROCEDURE — 94640 AIRWAY INHALATION TREATMENT: CPT

## 2022-07-05 PROCEDURE — 83036 HEMOGLOBIN GLYCOSYLATED A1C: CPT

## 2022-07-05 PROCEDURE — 36600 WITHDRAWAL OF ARTERIAL BLOOD: CPT

## 2022-07-05 PROCEDURE — 65610000006 HC RM INTENSIVE CARE

## 2022-07-05 PROCEDURE — C9254 INJECTION, LACOSAMIDE: HCPCS | Performed by: PSYCHIATRY & NEUROLOGY

## 2022-07-05 PROCEDURE — 71045 X-RAY EXAM CHEST 1 VIEW: CPT

## 2022-07-05 PROCEDURE — 74011000250 HC RX REV CODE- 250: Performed by: PSYCHIATRY & NEUROLOGY

## 2022-07-05 PROCEDURE — 74011250636 HC RX REV CODE- 250/636: Performed by: EMERGENCY MEDICINE

## 2022-07-05 RX ORDER — DEXTROSE MONOHYDRATE 100 MG/ML
0-250 INJECTION, SOLUTION INTRAVENOUS AS NEEDED
Status: DISCONTINUED | OUTPATIENT
Start: 2022-07-05 | End: 2022-07-09 | Stop reason: HOSPADM

## 2022-07-05 RX ORDER — DEXTROSE MONOHYDRATE AND SODIUM CHLORIDE 5; .45 G/100ML; G/100ML
25 INJECTION, SOLUTION INTRAVENOUS CONTINUOUS
Status: DISCONTINUED | OUTPATIENT
Start: 2022-07-05 | End: 2022-07-09 | Stop reason: HOSPADM

## 2022-07-05 RX ORDER — ARFORMOTEROL TARTRATE 15 UG/2ML
15 SOLUTION RESPIRATORY (INHALATION)
Status: DISCONTINUED | OUTPATIENT
Start: 2022-07-05 | End: 2022-07-09 | Stop reason: HOSPADM

## 2022-07-05 RX ORDER — INSULIN LISPRO 100 [IU]/ML
INJECTION, SOLUTION INTRAVENOUS; SUBCUTANEOUS EVERY 6 HOURS
Status: DISCONTINUED | OUTPATIENT
Start: 2022-07-05 | End: 2022-07-09 | Stop reason: HOSPADM

## 2022-07-05 RX ORDER — MAGNESIUM SULFATE 100 %
4 CRYSTALS MISCELLANEOUS AS NEEDED
Status: DISCONTINUED | OUTPATIENT
Start: 2022-07-05 | End: 2022-07-09 | Stop reason: HOSPADM

## 2022-07-05 RX ADMIN — PHENYTOIN SODIUM 100 MG: 50 INJECTION INTRAMUSCULAR; INTRAVENOUS at 05:02

## 2022-07-05 RX ADMIN — SODIUM CHLORIDE 200 MG: 9 INJECTION, SOLUTION INTRAVENOUS at 10:52

## 2022-07-05 RX ADMIN — FENTANYL CITRATE 25 MCG/HR: 0.05 INJECTION, SOLUTION INTRAMUSCULAR; INTRAVENOUS at 20:10

## 2022-07-05 RX ADMIN — SODIUM CHLORIDE 3 G: 900 INJECTION INTRAVENOUS at 20:05

## 2022-07-05 RX ADMIN — ENOXAPARIN SODIUM 40 MG: 100 INJECTION SUBCUTANEOUS at 10:57

## 2022-07-05 RX ADMIN — ACETAMINOPHEN 650 MG: 325 SOLUTION ORAL at 18:32

## 2022-07-05 RX ADMIN — MIDAZOLAM HYDROCHLORIDE 4 MG: 1 INJECTION, SOLUTION INTRAMUSCULAR; INTRAVENOUS at 15:13

## 2022-07-05 RX ADMIN — SODIUM CHLORIDE 100 ML/HR: 9 INJECTION, SOLUTION INTRAVENOUS at 00:11

## 2022-07-05 RX ADMIN — VALPROATE SODIUM 1000 MG: 100 INJECTION, SOLUTION INTRAVENOUS at 21:18

## 2022-07-05 RX ADMIN — DEXTROSE MONOHYDRATE AND SODIUM CHLORIDE 150 ML/HR: 5; .45 INJECTION, SOLUTION INTRAVENOUS at 17:46

## 2022-07-05 RX ADMIN — PROPOFOL 30 MCG/KG/MIN: 10 INJECTION, EMULSION INTRAVENOUS at 11:35

## 2022-07-05 RX ADMIN — DEXTROSE MONOHYDRATE 125 ML: 100 INJECTION, SOLUTION INTRAVENOUS at 11:43

## 2022-07-05 RX ADMIN — SODIUM CHLORIDE 200 MG: 9 INJECTION, SOLUTION INTRAVENOUS at 21:18

## 2022-07-05 RX ADMIN — PROPOFOL 45 MCG/KG/MIN: 10 INJECTION, EMULSION INTRAVENOUS at 19:18

## 2022-07-05 RX ADMIN — SODIUM CHLORIDE 100 ML/HR: 9 INJECTION, SOLUTION INTRAVENOUS at 10:57

## 2022-07-05 RX ADMIN — PHENYTOIN SODIUM 100 MG: 50 INJECTION INTRAMUSCULAR; INTRAVENOUS at 22:33

## 2022-07-05 RX ADMIN — 0.12% CHLORHEXIDINE GLUCONATE 10 ML: 1.2 RINSE ORAL at 20:10

## 2022-07-05 RX ADMIN — PROPOFOL 35 MCG/KG/MIN: 10 INJECTION, EMULSION INTRAVENOUS at 05:00

## 2022-07-05 RX ADMIN — ARFORMOTEROL TARTRATE 15 MCG: 15 SOLUTION RESPIRATORY (INHALATION) at 21:02

## 2022-07-05 RX ADMIN — SODIUM CHLORIDE 40 MG: 9 INJECTION, SOLUTION INTRAMUSCULAR; INTRAVENOUS; SUBCUTANEOUS at 10:57

## 2022-07-05 RX ADMIN — 0.12% CHLORHEXIDINE GLUCONATE 10 ML: 1.2 RINSE ORAL at 10:57

## 2022-07-05 RX ADMIN — SODIUM CHLORIDE 3 G: 900 INJECTION INTRAVENOUS at 23:45

## 2022-07-05 RX ADMIN — PHENYTOIN SODIUM 100 MG: 50 INJECTION INTRAMUSCULAR; INTRAVENOUS at 14:55

## 2022-07-05 RX ADMIN — VALPROATE SODIUM 1000 MG: 100 INJECTION, SOLUTION INTRAVENOUS at 10:52

## 2022-07-05 NOTE — PROGRESS NOTES
Problem: Ventilator Management  Goal: *Adequate oxygenation and ventilation  Outcome: Progressing Towards Goal  Goal: *Patient maintains clear airway/free of aspiration  Outcome: Progressing Towards Goal  Goal: *Absence of infection signs and symptoms  Outcome: Progressing Towards Goal  Goal: *Normal spontaneous ventilation  Outcome: Progressing Towards Goal     Problem: Patient Education: Go to Patient Education Activity  Goal: Patient/Family Education  Outcome: Progressing Towards Goal     Problem: Non-Violent Restraints  Goal: Removal from restraints as soon as assessed to be safe  Outcome: Progressing Towards Goal  Goal: No harm/injury to patient while restraints in use  Outcome: Progressing Towards Goal  Goal: Patient's dignity will be maintained  Outcome: Progressing Towards Goal  Goal: Patient Interventions  Outcome: Progressing Towards Goal     Problem: Non-Violent Restraints  Goal: Removal from restraints as soon as assessed to be safe  Outcome: Progressing Towards Goal  Goal: No harm/injury to patient while restraints in use  Outcome: Progressing Towards Goal  Goal: Patient's dignity will be maintained  Outcome: Progressing Towards Goal  Goal: Patient Interventions  Outcome: Progressing Towards Goal     Problem: Falls - Risk of  Goal: *Absence of Falls  Description: Document Barb Sanon Fall Risk and appropriate interventions in the flowsheet.   Outcome: Progressing Towards Goal  Note: Fall Risk Interventions:       Mentation Interventions: Adequate sleep, hydration, pain control,Bed/chair exit alarm,Door open when patient unattended,Reorient patient,Increase mobility,More frequent rounding,Room close to nurse's station,Toileting rounds,Update white board    Medication Interventions: Evaluate medications/consider consulting pharmacy,Bed/chair exit alarm    Elimination Interventions: Bed/chair exit alarm,Call light in reach,Toileting schedule/hourly rounds    History of Falls Interventions: Bed/chair exit alarm,Consult care management for discharge planning,Door open when patient unattended,Assess for delayed presentation/identification of injury for 48 hrs (comment for end date)         Problem: Patient Education: Go to Patient Education Activity  Goal: Patient/Family Education  Outcome: Progressing Towards Goal

## 2022-07-05 NOTE — PROGRESS NOTES
Hospitalist Progress Note    Patient: Noman Ansari MRN: [id number]  CSN: [id number]    YOB: 1995  Age: 33 y.o. Sex: male    DOA: 7/4/2022 LOS:  LOS: 1 day          Chief Complaint:  found unresponsive by paramedics    Assessment/Plan   Dg Thomas is a 33 y.o. male with seizure disorder, status post vagus nerve stimulator placement, asthma is brought to ER by EMS as he was found unresponsive by paramedics and postictal. Intubated to protect his airway.      Acute respiratory failure: Intubated for airway protection, vent management per intensivist    Unresponsive: Intubated for airway protection, neurology following, EEG in progress    Concern for aspiration ER: Received clindamycin in the ED, monitor off antibiotics for now    Status epilepticus: EEG in progress neurology following, reviewed Depakote 1000 mg twice daily, Dilantin 100 mg 3 times daily and Vimpat 200 mg twice daily, Zonegran 300 mg nightly was restarted and Klonopin 0.5 mg nightly after extubation ordered by neurology    Urine drug screen positive for THC    Disposition : TBD  Patient Active Problem List   Diagnosis Code    [facility] G[facility].901    Res[facility] failure ([facility] J96.90    Marijuana use F12.90    Hypernatremia E87.0       Subjective:    Vented and sedated  Patient opening eyes and tracking but very lethargic of course    Review of systems:    Constitutional: denies fevers, chills, myalgias  Respiratory: denies SOB, cough  Cardiovascular: denies chest pain, palpitations  Gastrointestinal: denies nausea, vomiting, diarrhea      Vital signs/Intake and Output:  Visit Vitals  /65   Pulse 77   Temp 99.1 °F (37.3 °C)   Resp 15   Ht 5' 5.98\" (1.676 m)   Wt 81.6 kg (179 lb 14.3 oz)   SpO2 100%   BMI 29.05 kg/m²     Current Shift:  07/05 0701 - 07/05 1900  In: -   Out: 840 [Urine:840]  Last three shifts:  07/03 1901 - 07/05 0700  In: 3204.6 [I.V.:3204.6]  Out: 2800 [Urine:2800]    Exam:    General: Vented and sedated  Head/Neck: NCAT, supple, No masses, No lymphadenopathy  CVS:Regular rate and rhythm, no M/R/G, S1/S2 heard, no thrill  Lungs:Clear to auscultation bilaterally, no wheezes, rhonchi, or rales  Abdomen: Soft, Nontender, No distention, Normal Bowel sounds, No hepatomegaly  Extremities: No C/C/E, pulses palpable 2+  Skin:normal texture and turgor, no rashes, no lesions  Neuro: Vented and sedated  Psych:appropriate                Labs: Results:       Chemistry Recent Labs     07/04/22  0630   *   *   K 4.0   *   CO2 13*   BUN 19*   CREA 1.51*   CA 9.8   AGAP 20*   BUCR 13      CBC w/Diff Recent Labs     07/04/22  0630   WBC 10.7   RBC 4.64   HGB 14.4   HCT 45.7      GRANS 61   LYMPH 29   EOS 0      Cardiac Enzymes Recent Labs     07/04/22  0630   *      Coagulation Recent Labs     07/04/22  0630   PTP 14.3   INR 1.1   APTT 25.0       Lipid Panel No results found for: CHOL, CHOLPOCT, CHOLX, CHLST, CHOLV, 931031, HDL, HDLP, LDL, LDLC, DLDLP, 122880, VLDLC, VLDL, TGLX, TRIGL, TRIGP, TGLPOCT, CHHD, CHHDX   BNP No results for input(s): BNPP in the last 72 hours. Liver Enzymes No results for input(s): TP, ALB, TBIL, AP in the last 72 hours.     No lab exists for component: SGOT, GPT, DBIL   Thyroid Studies No results found for: T4, T3U, TSH, TSHEXT     Procedures/imaging: see electronic medical records for all procedures/Xrays and details which were not copied into this note but were reviewed prior to creation of De Buck MD

## 2022-07-05 NOTE — PROGRESS NOTES
conducted a follow up visit with Nicole Shultz, who is a 33 y.o.,male. Patient is intubated and not responsive to me. Called his mom who was appreciative of me calling. She \"feels better\" about how he is doing and asked for continued prayers for him. Continued the relationship of care and support. Listened empathically. Offered prayer and assurance of continued prayer on patients behalf. Chart reviewed. Family expressed gratitude for Spiritual Care visit. Patient was reviewed in ICU Interdisciplinary Rounds. Chaplains will continue to follow and will provide pastoral care as needed or requested.  recommends bedside caregivers page the  on duty if patient shows signs of acute spiritual or emotional distress. [phone] [phone].    Board Certified   [phone] - Office

## 2022-07-05 NOTE — PROGRESS NOTES
1930- Report and care received, assessment completed per flow sheet. Intubated, sedated, EEG in progress, no seizure activity noted at this time. 0000- Reassessment without change. 0340- Reassessment without change. 0510- Seizure like/generalized shaking of the head/upper body. Versed drip increased, stopped without further intervention.

## 2022-07-05 NOTE — DIABETES MGMT
Diabetes/ Glycemic Control Plan of Care  Recommendations:   Recommend IP subcutaneous order set to include   POC glucose monitoring Q6 hours    Corrective Humalog Q6 hours  Assessment: Patient has no history of diabetes. He is currently intubated for airway protection after a seizure. BG ranging 205-82 mg/dl since admission. DX:   1. Status epilepticus ([address]     2. Closed head injury, initial encounter     3. Abrasion of left wrist, initial encounter        Recent Glucose Results:   Lab Results   Component Value Date/Time    GLUCPOC 82 07/04/2022 06:33 PM          BG within target range (non-ICU: <180; -180):  [] Yes    [x] No   Current insulin orders: none  Total Daily Dose previous 24 hours = none  Current A1c: No results found for: HBA1C, [id number], LGL5OGBY     Nutrition/Diet: DIET NPO     Home diabetes medications:   Key Antihyperglycemic Medications     Patient is on no antihyperglycemic meds. Plan/Goals:   Blood glucose will be within target of 70 - 180 mg/dl within 72 hours  Reinforce dietary and medication compliance at home.        Education:  [] Refer to Diabetes Education Record                       [x] Education not indicated at this time     Padmini Marcos RD  Glycemic Control Team  [phone]    Monday-Friday   9 am - 3 pm

## 2022-07-05 NOTE — PROGRESS NOTES
Reason for Admission:   Status epilepticus ([facility] [address] [G40.901]    PCP: Jennifer Rouse MD    There are currently no Active Isolations  No active infections                RUR Score:     10             Resources/supports,as identified by patient/family:       Patient intubated           Plan for utilizing home health:    No                          Likelihood of readmission:   Low         Transition of Care Plan:       Home family assistance             Patient currently intubated, unable to answer any questions at this time. No ACP documents on file, awaiting extubation to complete full assessment.      Patient's current insurance is Payor: [facility] MEDICAID / Plan: [place] [address] / Product Type: Managed Care Medicaid /        Discharge Location  Patient Expects to be Discharged to[de-identified] Home with family assistance

## 2022-07-05 NOTE — CONSULTS
[facility]  Pulmonary, Critical Care, and Sleep Medicine    Name: Speedy Smith MRN: [id number]   : 1995 Hospital: [facility] [place]    Date: 2022  Room: [address] [place] [address] Note                                              Consult requesting physician: Dr. Thedford Ormond  Reason for Consult: Ventilator management, status epilepticus    IMPRESSION:       DONOVAN/Gerardo Mohr 1106 Problems    Diagnosis Date Noted    Status epilepticus ([facility] [address] 2022    Respiratory failure ([address] 2022    Marijuana use 2022    Hypernatremia 2022   ·      Patient Active Problem List   Diagnosis Code    Status epilepticus ([facility] [address] G40.901    Respiratory failure ([facility] [address] J96.90    Marijuana use F12.90    Hypernatremia E87.0         · Code status: Full code      RECOMMENDATIONS:     Respiratory: Intubated for airway protection due to multiple seizures, status epilepticus. History of asthma and smoker. Bronchodilators: Albuterol as needed. ABg stable  CXR: ETT in place. Bibasilar mild atelectasis. CT chest abdomen pelvis 2022: Nil acute. Bibasilar atelectasis. _______________     IMPRESSION     1. No traumatic injury identified.     2. Bilateral lower lobe opacities could relate to atelectasis, infiltrate,  and/or aspiration. ABG with respiratory acidosis; will repeat ABG and adjust ventilator accordingly. Currently on assist control ventilator; will adjust after ABG. Sedation: Currently on propofol drip in ER; will change to Versed drip. Versed/fentanyl as needed. When seizure controlled, may consider stopping sedation and evaluate for SBT tomorrow morning. Ventilator bundle & Sedation protocol followed. Daily sedation holiday, assessment for readiness for SBT and then re-titrate if required. Chlorhexidine mouth washes. Keep SPO2 >=92%. HOB 30 degree elevation all the time. Aggressive pulmonary toileting. Aspiration precautions.     CVS:   Blood pressure mildly elevated; monitor. ID:   Thought to have mild aspiration; but no aspiration noted at the time of intubation per Dr. Valerio Bunch. Received clindamycin x1 in ER. No fever or leukocytosis. No signs of infection. Observe off antibiotics. Hematology/Oncology: Normal hemoglobin, platelets and coags. Monitor. Renal: JAVIER  Mildly elevated creatinine; cpk elevated continue hydration  Mild hypernatremia, expected to improve with IVF. GI/: No acute issues. Endocrine: Monitor BS. SSI. Neurology: History of seizure with recurrent hospitalization; last [address] hospitalization 04/2022. Multiple seizures/status epilepticus. CTA head and neck nil acute. Received Keppra in ER. Currently on propofol drip; will change to Versed drip. Will defer antiseizure medications to neurologist.  Neurology Dr. Dianna Jefferson consulted from ER. Psychiatry: No acute issues. Toxicology: Urine drug screen positive for THC and benzo; patient received benzo before drug screen test.    Pain/Sedation: Sedation protocol as above. Skin/Wound: Contusion on left eyebrow and abrasion on the left upper extremity. Electrolytes: Replace electrolytes per ICU electrolyte replacement protocol. IVF: NS at 100 mL/h. Nutrition: N.p.o.    Prophylaxis: DVT Prophylaxis: SCD/Lovenox. GI Prophylaxis: Protonix. Restraints:   Wrist soft restraints for patient interfering with medical therapy/management and patient safety. Lines/Tubes: PIV  ETT: 7/4/2022. Contreras: To be placed 7/4/2022 (Medically necessary for strict input/output monitoring in critically ill patient, will remove it when not needed. Contreras bundle followed). Advance Directive/Palliative Care: Per clinical course.     Will defer respective systems problem management to primary and other respective consultant and follow patient in ICU with primary and other medical team.  Further recommendations will be based on the patient's response to recommended treatment and results of the investigation ordered. Quality Care: PPI, DVT prophylaxis, HOB elevated, Infection control all reviewed and addressed. Care of plan d/w RN, RT, MDR, Dr. Stacy Johnson, Dr. Nicole Arnold  High complexity decision making was performed during the evaluation of this patient at high risk for decompensation with multiple organ involvement. Total critical care time spent rendering care exclusive of procedures/family discussion/coordination of care: 46 minutes. Subjective/History of Present Illness:     Patient is a 33 y.o. male with PMHx significant for asthma, seizure, s/p vagal nerve stimulator, smoker, polysubstance abuse; being admitted for status epilepticus. Patient was found unresponsive by paramedics; head to seizure episodes in ER; received Keppra and benzo and intubated for airway protection due to status epilepticus. Recent [place] hospitalization for seizure in 04/2022, and at [facility] in 09/2021.    7/5/2022 :   Seen in ER room #6. Intubated and sedated with propofol drip.  continue hydration  [place] [id number] pending  In AM wean sedation to extubate    I/O last 24 hrs: Intake/Output Summary (Last 24 hours) at 7/5/2022 1648  Last data filed at 7/5/2022 1459  Gross per 24 hour   Intake 4306.34 ml   Output 3640 ml   Net 666.34 ml         The patient is critically ill and can not provide additional history due to Ventilated    History taken from EMR     Review of Systems:  ROS not obtained due to patient factor. No Known Allergies   Past Medical History:   Diagnosis Date    Asthma     Seiz[facility] ([facility] [address]     Status post VNS (vagus nerve stimulator) placement       No past surgical history on file. Social History     Tobacco Use    Smoking status: Not on file    Smokeless tobacco: Not on file   Substance Use Topics    Alcohol use: Not on file      No family history on file. Prior to Admission medications    Medication Sig Start Date End Date Taking?  Authorizing Provider   carBAMazepine (TEGretoL) 200 mg tablet Take 200 mg by mouth three (3) times daily.     Wallace, MD Ángela     Current Facility-Administered Medications   Medication Dose Route Frequency    insulin lispro (HUMALOG) injection   SubCUTAneous Q6H    dextrose 5 % - 0.45% NaCl infusion  150 mL/hr IntraVENous CONTINUOUS    propofol (DIPRIVAN) 10 mg/mL infusion  0-50 mcg/kg/min IntraVENous TITRATE    midazolam in normal saline (VERSED) 1 mg/mL infusion  0-10 mg/hr IntraVENous TITRATE    chlorhexidine (PERIDEX) 0.12 % mouthwash 10 mL  10 mL Oral Q12H    pantoprazole (PROTONIX) 40 mg in 0.9% sodium chloride 10 mL injection  40 mg IntraVENous Q24H    [Held by provider] 0.9% sodium chloride infusion  100 mL/hr IntraVENous CONTINUOUS    enoxaparin (LOVENOX) injection 40 mg  40 mg SubCUTAneous Q24H    fentaNYL (PF) 900 mcg/30 ml infusion soln  0-200 mcg/hr IntraVENous TITRATE    valproate (DEPACON) 1,000 mg in 0.9% sodium chloride 50 mL IVPB  1 g IntraVENous BID    lacosamide (VIMPAT) 200 mg in 0.9% sodium chloride 100 mL IVPB  200 mg IntraVENous BID    phenytoin (DILANTIN) injection 100 mg  100 mg IntraVENous Q8H         Objective:   Vital Signs:    Visit Vitals  /66   Pulse 81   Temp 98.4 °F (36.9 °C)   Resp 15   Ht 5' 5.98\" (1.676 m)   Wt 81.6 kg (179 lb 14.3 oz)   SpO2 100%   BMI 29.05 kg/m²       O2 Device: Endotracheal tube,Ventilator       Temp (24hrs), Av.2 °F (36.8 °C), Min:97.5 °F (36.4 °C), Max:99.1 °F (37.3 °C)       Intake/Output:   Last shift:       07 -  190  In: 1101.7 [I.V.:1071.7]  Out: 840 [Urine:840]    Last 3 shifts: 1901 -  0700  In: 3204.6 [I.V.:3204.6]  Out: 2800 [Urine:2800]      Intake/Output Summary (Last 24 hours) at 2022 1648  Last data filed at 2022 1459  Gross per 24 hour   Intake 4306.34 ml   Output 3640 ml   Net 666.34 ml       Last 3 Recorded Weights in this Encounter    22 0706 22   Weight: 81.6 kg (179 lb 14.3 oz) 81.6 kg (179 lb 14.3 oz)         Ventilator Settings:  Mode Rate Tidal Volume Pressure FiO2 PEEP   Assist control   450 ml    30 % 5 cm H20     Peak airway pressure: 20 cm H2O    Plateau pressure:     Tidal volume:    Minute ventilation: 11.6 l/min     Recent Labs     07/05/22  0427 07/04/22  1209 07/04/22  0804   PHI 7.44 7.43 7.18*   PCO2I 31.1* 32.6* 53.9*   PO2I 97 103* 136*   HCO3I 21.0* 21.8* 20.3*   FIO2I 30 50 50       Physical Exam:     General/Neurology: Intubated, on ventilator, sedated. Moving all 4 extremities spontaneously. Opens eyes   Head:   Normocephalic, without obvious abnormality, atraumatic. Eye:   Pupils bilateral equal and reactive to light. No scleral icterus, no pallor, no cyanosis. Nose:   No sinus tenderness  Throat:  Lips, mucosa, and tongue normal. No oral thrush. Neck:   Supple, symmetric. No lymphadenopathy. Trachea midline  Lung: Moderate air entry bilateral equal. No rales. At the bsaes decresed brath sounds No rhonchi. No wheezing. No stridors. No prolongded expiration. No accessory muscle use. Heart:   Regular rate & rhythm. S1 S2 present. No murmur. No JVD. Abdomen:  Soft. NT. ND. +BS. No masses. Extremities:  No pedal edema. No cyanosis. No clubbing. Pulses: 2+ and symmetric in DP. Capillary refill: normal  Lymphatic:  No cervical or supraclavicular palpable lymphadenopathy. Musculoskeletal: No joint swelling. No tenderness. Skin:   Color, texture, turgor normal. No rashes or lesions.        Data:       Recent Results (from the past 24 hour(s))   GLUCOSE, POC    Collection Time: 07/04/22  6:33 PM   Result Value Ref Range    Glucose (POC) 82 70 - 110 mg/dL   BLOOD GAS, ARTERIAL POC    Collection Time: 07/05/22  4:27 AM   Result Value Ref Range    Device: ADULT VENT      FIO2 (POC) 30 %    pH (POC) 7.44 7.35 - 7.45      pCO2 (POC) 31.1 (L) 35.0 - 45.0 MMHG    pO2 (POC) 97 80 - 100 MMHG    HCO3 (POC) 21.0 (L) 22 - 26 MMOL/L    sO2 (POC) 98.0 (H) 92 - 97 %    Base deficit (POC) 2.5 mmol/L    Mode ASSIST CONTROL      Tidal volume 450 ml    Set Rate 20 bpm    PEEP/CPAP (POC) 5 cmH2O    Allens test (POC) NOT APPLICABLE      Site RIGHT RADIAL      Patient temp. 98      Specimen type (POC) ARTERIAL      Performed by Viki Nunes    GLUCOSE, POC    Collection Time: 07/05/22 11:39 AM   Result Value Ref Range    Glucose (POC) 60 (L) 70 - 110 mg/dL   GLUCOSE, POC    Collection Time: 07/05/22 11:42 AM   Result Value Ref Range    Glucose (POC) 71 70 - 110 mg/dL   GLUCOSE, POC    Collection Time: 07/05/22 12:00 PM   Result Value Ref Range    Glucose (POC) 89 70 - 110 mg/dL         Chemistry Recent Labs     07/04/22  0630   *   *   K 4.0   *   CO2 13*   BUN 19*   CREA 1.51*   CA 9.8   AGAP 20*   BUCR 13        Lactic Acid No results found for: LAC  No results for input(s): LAC in the last 72 hours. Liver Enzymes Protein, total   Date Value Ref Range Status   11/22/2016 7.4 6.4 - 8.2 g/dL Final     Albumin   Date Value Ref Range Status   11/22/2016 4.3 3.4 - 5.0 g/dL Final     Globulin   Date Value Ref Range Status   11/22/2016 3.1 2.0 - 4.0 g/dL Final     A-G Ratio   Date Value Ref Range Status   11/22/2016 1.4 0.8 - 1.7   Final     Alk. phosphatase   Date Value Ref Range Status   11/22/2016 104 45 - 117 U/L Final     No results for input(s): TP, ALB, GLOB, AGRAT, AP, TBIL in the last 72 hours.     No lab exists for component: SGOT, GPT, DBIL     CBC w/Diff Recent Labs     07/04/22  0630   WBC 10.7   RBC 4.64   HGB 14.4   HCT 45.7      GRANS 61   LYMPH 29   EOS 0        Cardiac Enzymes No results found for: CPK, CK, CKMMB, CKMB, RCK3, CKMBT, CKNDX, CKND1, ANDRÉS, TROPT, TROIQ, ADAM, TROPT, TNIPOC, BNP, BNPP     BNP No results found for: BNP, BNPP, XBNPT     Coagulation Recent Labs     07/04/22  0630   PTP 14.3   INR 1.1   APTT 25.0         Thyroid  No results found for: T4, T3U, TSH, TSHEXT, TSHEXT    No results found for: T4     Urinalysis Lab Results   Component Value Date/Time    Color YELLOW 07/04/2022 07:50 AM    Appearance CLEAR 07/04/2022 07:50 AM    Specific gravity 1.030 07/04/2022 07:50 AM    pH (UA) 5.5 07/04/2022 07:50 AM    Protein 30 (A) 07/04/2022 07:50 AM    Glucose 500 (A) 07/04/2022 07:50 AM    Ketone Negative 07/04/2022 07:50 AM    Bilirubin Negative 07/04/2022 07:50 AM    Urobilinogen 0.2 07/04/2022 07:50 AM    Nitrites Negative 07/04/2022 07:50 AM    Leukocyte Esterase Negative 07/04/2022 07:50 AM    Epithelial cells FEW 07/04/2022 07:50 AM    Bacteria Negative 07/04/2022 07:50 AM    WBC Negative 07/04/2022 07:50 AM    RBC 0 to 3 07/04/2022 07:50 AM        Micro  No results for input(s): SDES, CULT in the last 72 hours. No results for input(s): CULT in the last 72 hours. Culture data during this hospitalization. All Micro Results     None             CT HEAD WO CONT    Result Date: 7/4/2022  EXAM: CT head CLINICAL INDICATION/HISTORY: Fall, seizure COMPARISON: None. TECHNIQUE: Axial CT imaging of the head was performed without intravenous contrast. One or more dose reduction techniques were used on this CT: automated exposure control, adjustment of the mAs and/or kVp according to patient size, and iterative reconstruction techniques. The specific techniques used on this CT exam have been documented in the patient's electronic medical record. Digital Imaging and Communications in Medicine (DICOM) format image data are available to nonaffiliated external healthcare facilities or entities on a secured, media free, reciprocally searchable basis with patient authorization for at least a 12-month period after this study. _______________ FINDINGS: BRAIN AND EXTRA-AXIAL SPACES: Brain stimulator electrodes identified. No intracranial hemorrhage, midline shift, or uncal herniation. There are no areas of abnormal parenchymal attenuation. There are no abnormal extra-axial fluid collections. CALVARIUM: Postsurgical changes involving right frontoparietal calvarium. SINUSES: Clear.  OTHER: None. _______________     1. No acute intracranial abnormalities. CT CHEST ABD PELV W CONT    Result Date: 7/4/2022  EXAM: CT of the chest, abdomen, and pelvis CLINICAL INDICATION/HISTORY: Seizure, chest pain, abdominal pain COMPARISON: None. TECHNIQUE: Axial CT imaging of the chest, abdomen, and pelvis was performed with intravenous contrast. Multiplanar reformats were generated. One or more dose reduction techniques were used on this CT: automated exposure control, adjustment of the mAs and/or kVp according to patient size, and iterative reconstruction techniques. The specific techniques used on this CT exam have been documented in the patient's electronic medical record. Digital Imaging and Communications in Medicine (DICOM) format image data are available to nonaffiliated external healthcare facilities or entities on a secured, media free, reciprocally searchable basis with patient authorization for at least a 12-month period after this study. _______________ FINDINGS: CHEST: LUNGS: Patchy atelectasis and/or infiltrate in the lower lobes. AIRWAY: Endotracheal tube is in place. Secretions identified in the airway. PLEURA: Normal, with no effusion or pneumothorax. MEDIASTINUM: Normal heart size. No pericardial effusion. Vasculature is unremarkable. LYMPH NODES: No enlarged lymph nodes. OTHER: None. =============== ABDOMEN/PELVIS: LIVER, BILIARY: Liver is unremarkable. No biliary dilation. Gallbladder is unremarkable. PANCREAS: Unremarkable. SPLEEN: Unremarkable. ADRENALS: Unremarkable. KIDNEYS/URETERS/BLADDER: Unremarkable. PELVIC ORGANS: Unremarkable. LYMPH NODES: No enlarged lymph nodes. GASTROINTESTINAL TRACT: No bowel dilation or wall thickening. VASCULATURE: Unremarkable. BONES: No acute or aggressive osseous abnormalities identified. OTHER: None. _______________     1. No traumatic injury identified. 2. Bilateral lower lobe opacities could relate to atelectasis, infiltrate, and/or aspiration.      CT SPINE CERV WO CONT    Result Date: 7/4/2022  EXAM: CT cervical spine CLINICAL INDICATION/HISTORY: Trauma, neck pain. COMPARISON: None. TECHNIQUE: Axial CT imaging of the cervical spine was performed from the skull base to the thoracic inlet without intravenous contrast. Multiplanar reformats were generated. One or more dose reduction techniques were used on this CT: automated exposure control, adjustment of the mAs and/or kVp according to patient size, and iterative reconstruction techniques. The specific techniques used on this CT exam have been documented in the patient's electronic medical record. Digital Imaging and Communications in Medicine (DICOM) format image data are available to nonaffiliated external healthcare facilities or entities on a secured, media free, reciprocally searchable basis with patient authorization for at least a 12-month period after this study. _______________ FINDINGS: VERTEBRAE AND DISCS: Alignment and vertebral height is maintained with no fracture or traumatic subluxation identified. SPINAL CANAL AND FORAMINA: Patent without significant bony canal or foramina encroachment. PREVERTEBRAL SOFT TISSUES: No prevertebral soft tissue swelling. VISIBLE PORTIONS OF POSTERIOR FOSSA/BRAIN: Unremarkable. LUNG APICES: Clear. OTHER: None. _______________     1. No cervical spine fracture. XR CHEST PORT    Result Date: 7/4/2022  EXAM: CHEST RADIOGRAPH CLINICAL INDICATION/HISTORY: pain   > Additional: Chest pain COMPARISON: None TECHNIQUE: Portable frontal view of the chest _______________ FINDINGS: SUPPORT DEVICES: Endotracheal tube is in good position. HEART AND MEDIASTINUM: Heart size is normal. LUNGS AND PLEURAL SPACES: Bilateral basilar atelectasis and or infiltrate. No pleural effusion or pneumothorax. BONES AND SOFT TISSUES: Stimulator generator projects over the left chest. _______________     1. Endotracheal tube is in good position.  2. Bilateral basilar atelectasis and/or infiltrate. Images report reviewed by me:  CT (Most Recent) (CT chest reviewed by me) Results from Hospital Encounter encounter on 07/04/22    CT CHEST ABD PELV W CONT    Narrative  EXAM: CT of the chest, abdomen, and pelvis    CLINICAL INDICATION/HISTORY: Seizure, chest pain, abdominal pain    COMPARISON: None. TECHNIQUE: Axial CT imaging of the chest, abdomen, and pelvis was performed with  intravenous contrast. Multiplanar reformats were generated. One or more dose  reduction techniques were used on this CT: automated exposure control,  adjustment of the mAs and/or kVp according to patient size, and iterative  reconstruction techniques. The specific techniques used on this CT exam have  been documented in the patient's electronic medical record. Digital Imaging and  Communications in Medicine (DICOM) format image data are available to  nonaffiliated external healthcare facilities or entities on a secured, media  free, reciprocally searchable basis with patient authorization for at least a  12-month period after this study. _______________    FINDINGS:    CHEST:    LUNGS: Patchy atelectasis and/or infiltrate in the lower lobes. AIRWAY: Endotracheal tube is in place. Secretions identified in the airway. PLEURA: Normal, with no effusion or pneumothorax. MEDIASTINUM: Normal heart size. No pericardial effusion. Vasculature is  unremarkable. LYMPH NODES: No enlarged lymph nodes. OTHER: None.    ===============    ABDOMEN/PELVIS:    LIVER, BILIARY: Liver is unremarkable. No biliary dilation. Gallbladder is  unremarkable. PANCREAS: Unremarkable. SPLEEN: Unremarkable. ADRENALS: Unremarkable. KIDNEYS/URETERS/BLADDER: Unremarkable. PELVIC ORGANS: Unremarkable. LYMPH NODES: No enlarged lymph nodes. GASTROINTESTINAL TRACT: No bowel dilation or wall thickening. VASCULATURE: Unremarkable. BONES: No acute or aggressive osseous abnormalities identified.     OTHER: None.    _______________    Impression  1. No traumatic injury identified. 2. Bilateral lower lobe opacities could relate to atelectasis, infiltrate,  and/or aspiration. CXR reviewed by me:  XR (Most Recent). CXR  reviewed by me and compared with previous CXR Results from Hospital Encounter encounter on 07/04/22    XR ABD (KUB)    Narrative  EXAM: Frontal view of the abdomen    CLINICAL INDICATION/HISTORY: OG tube placement    COMPARISON: None.    _______________    FINDINGS:    NG/OG tube is in place with the tip in the right abdomen in the region of the  gastric antrum. No bowel obstruction identified. _______________    Impression  1. NG/OG tube is in good position. ·Please note: Voice-recognition software may have been used to generate this report, which may have resulted in some phonetic-based errors in grammar and contents. Even though attempts were made to correct all the mistakes, some may have been missed, and remained in the body of the document.       Arturo Ulloa MD  7/5/2022

## 2022-07-05 NOTE — PROGRESS NOTES
0700 Bedside and Verbal shift change report given to Yessica Jim RN (oncoming nurse) by Reinier Delgado RN (offgoing nurse). Report included the following information SBAR, Kardex, Intake/Output and Recent Results. 0800 Remains intubated, sedated, and restrained for patient safety. Seizure precautions implemented. Critical are continues. 1200 Reassessment, no changes. 1515 Patient with seizure activity. Head and upper body shaking, eyes open and upward. PRN versed given. Patient calmed down. 1600 Reassessment, no changes. 1630 Patient with low grade fevers. Covers removed and ice packs applied. [phone] Patient continues with fever. Order obtained for tylenol by Dr. Merari Sheth. Blood and resp cx ordered. Moderate amount of foul smelling yellow sputum. [phone] Order obtained for CXR by Dr. Petra Moore. 1900 Bedside and Verbal shift change report given to Reinier Delgado RN (oncoming nurse) by Yessica Jim RN (offgoing nurse). Report included the following information SBAR, Kardex, Intake/Output and Recent Results.

## 2022-07-05 NOTE — PROGRESS NOTES
Lower fentanyl and versed drip over next 12 hrs   continue propofol  Goal to extubate in AM if no more seizures  CONSTANCE Maddox MD

## 2022-07-05 NOTE — PROCEDURES
ELECTROENCEPHALOGRAPHY     Patient: Denese Lesches MRN: [id number]  CSN: [id number]    YOB: 1995  Age: 33 y.o. Sex: male    DOA: 7/4/2022 LOS:  LOS: 1 day        Date of Service: 16:01 7/4/2022 to  08:05  7/5/2022    DICTATING: Edwardo Mclain MD     REFERRING PHYSICIAN: Dr. Rosas Laura: 22-47    HISTORY OF PRESENT ILLNESS: This is a 78-year-old male, who presented with status epilepticus. This EEG was performed in order to assess electrodiagnostic risk factors for epilepsy. ELECTROENCEPHALOGRAM INTERPRETATION: This is a referential and bipolar EEG recorded with a 10-20 system. EEG background during wakefulness does not show posterior dominant rhythm. There is generalized 2-3 hertz delta activity intermixed with 4 to 7 hertz theta activity intermixed with the background. A stage II sleep pattern is accompanied by vertex waves, sleep spindles and K-complexes. There are occasional bilateral temporal sharp waves. There are intermittent bilateral RNS activation.     IMPRESSION: This is an abnormal long term EEG. There are intermittent bilateral temporal sharp waves and bilateral RNS activation. There are no active seizures on EEG. Signed:   Edwardo Mclain MD  7/5/2022  9:19 AM

## 2022-07-05 NOTE — PROGRESS NOTES
NEUROLOGY PROGRESS NOTE        Patient: Lashell Ruano        Sex: male          DOA: 7/4/2022  YOB: 1995      Age:  33 y.o.         LOS: 1 day     Identification:  [id number] ROBERTO male with past medical history of refractory seizure presents with multiple seizures. SUBJECTIVE:   Overnight, patient had seizure-like activity around 5 AM this morning with no correlated EEG changes. Continuous EEG from 1600 to 8:00 this morning does not show active seizure. REVIEW OF SYSTEMS: Denies chest pain, abdominal pain, nausea or vomiting. No fever or chills. OBJECTIVE:      Visit Vitals  /65   Pulse 62   Temp 97.5 °F (36.4 °C)   Resp 15   Ht 5' 5.98\" (1.676 m)   Wt 81.6 kg (179 lb 14.3 oz)   SpO2 100%   BMI 29.05 kg/m²     Physical Exam:  GEN: Alert, NAD  EYES: conjunctiva normal, lids with out lesions  HEENT: MMM. HEART: RRR +S1 +S2  LUNGS: CTA B/L no rales or rhonchi. ABDOMEN: Soft, non-tender. EXTREMITIES: No edema cyanosis  SKIN: no rashes or skin breakdown, no nodules  NEURO: Intubated and sedated. PERRLA, no spontaneous movement of all 4 extremities. No pain withdrawal.  DTR absent throughout. Bilateral toes are mute.     Current Facility-Administered Medications   Medication Dose Route Frequency    insulin lispro (HUMALOG) injection   SubCUTAneous Q6H    glucose chewable tablet 16 g  4 Tablet Oral PRN    glucagon (GLUCAGEN) injection 1 mg  1 mg IntraMUSCular PRN    dextrose 10% infusion 0-250 mL  0-250 mL IntraVENous PRN    propofol (DIPRIVAN) 10 mg/mL infusion  0-50 mcg/kg/min IntraVENous TITRATE    midazolam in normal saline (VERSED) 1 mg/mL infusion  0-10 mg/hr IntraVENous TITRATE    midazolam (VERSED) injection 1 mg  1 mg IntraVENous Q2H PRN    fentaNYL citrate (PF) injection 25 mcg  25 mcg IntraVENous Q4H PRN    chlorhexidine (PERIDEX) 0.12 % mouthwash 10 mL  10 mL Oral Q12H    pantoprazole (PROTONIX) 40 mg in 0.9% sodium chloride 10 mL injection  40 mg IntraVENous Q24H    0.9% sodium chloride infusion  100 mL/hr IntraVENous CONTINUOUS    enoxaparin (LOVENOX) injection 40 mg  40 mg SubCUTAneous Q24H    albuterol CONCENTRATE 2.5mg/0.5 mL neb soln  2.5 mg Nebulization Q4H PRN    fentaNYL (PF) 900 mcg/30 ml infusion soln  0-200 mcg/hr IntraVENous TITRATE    valproate (DEPACON) 1,000 mg in 0.9% sodium chloride 50 mL IVPB  1 g IntraVENous BID    lacosamide (VIMPAT) 200 mg in 0.9% sodium chloride 100 mL IVPB  200 mg IntraVENous BID    midazolam (VERSED) injection 4 mg  4 mg IntraVENous Q4H PRN    phenytoin (DILANTIN) injection 100 mg  100 mg IntraVENous Q8H       Laboratory  Recent Results (from the past 24 hour(s))   BLOOD GAS, ARTERIAL POC    Collection Time: 07/04/22 12:09 PM   Result Value Ref Range    Device: ADULT VENT      FIO2 (POC) 50 %    pH (POC) 7.43 7.35 - 7.45      pCO2 (POC) 32.6 (L) 35.0 - 45.0 MMHG    pO2 (POC) 103 (H) 80 - 100 MMHG    HCO3 (POC) 21.8 (L) 22 - 26 MMOL/L    sO2 (POC) 98.2 (H) 92 - 97 %    Base deficit (POC) 1.8 mmol/L    Mode ASSIST CONTROL      Tidal volume 450 ml    Set Rate 22 bpm    PEEP/CPAP (POC) 5 cmH2O    Site RIGHT RADIAL      Specimen type (POC) ARTERIAL      Performed by Danya Dave    GLUCOSE, POC    Collection Time: 07/04/22  6:33 PM   Result Value Ref Range    Glucose (POC) 82 70 - 110 mg/dL   BLOOD GAS, ARTERIAL POC    Collection Time: 07/05/22  4:27 AM   Result Value Ref Range    Device: ADULT VENT      FIO2 (POC) 30 %    pH (POC) 7.44 7.35 - 7.45      pCO2 (POC) 31.1 (L) 35.0 - 45.0 MMHG    pO2 (POC) 97 80 - 100 MMHG    HCO3 (POC) 21.0 (L) 22 - 26 MMOL/L    sO2 (POC) 98.0 (H) 92 - 97 %    Base deficit (POC) 2.5 mmol/L    Mode ASSIST CONTROL      Tidal volume 450 ml    Set Rate 20 bpm    PEEP/CPAP (POC) 5 cmH2O    Allens test (POC) NOT APPLICABLE      Site RIGHT RADIAL      Patient temp.  98      Specimen type (POC) ARTERIAL      Performed by Violette Huerta        Radiology:  XR WRIST LT AP/LAT/OBL MIN 3V    Result Date: 7/4/2022  EXAM: 3 views left wrist CLINICAL INDICATION/HISTORY: Left wrist pain COMPARISON: None. _______________ FINDINGS: No fracture or dislocation. Joint spaces are maintained. _______________     1. No fracture or dislocation. XR ABD (KUB)    Result Date: 7/4/2022  EXAM: Frontal view of the abdomen CLINICAL INDICATION/HISTORY: OG tube placement COMPARISON: None. _______________ FINDINGS: NG/OG tube is in place with the tip in the right abdomen in the region of the gastric antrum. No bowel obstruction identified. _______________     1. NG/OG tube is in good position. CT HEAD WO CONT    Result Date: 7/4/2022  EXAM: CT head CLINICAL INDICATION/HISTORY: Fall, seizure COMPARISON: None. TECHNIQUE: Axial CT imaging of the head was performed without intravenous contrast. One or more dose reduction techniques were used on this CT: automated exposure control, adjustment of the mAs and/or kVp according to patient size, and iterative reconstruction techniques. The specific techniques used on this CT exam have been documented in the patient's electronic medical record. Digital Imaging and Communications in Medicine (DICOM) format image data are available to nonaffiliated external healthcare facilities or entities on a secured, media free, reciprocally searchable basis with patient authorization for at least a 12-month period after this study. _______________ FINDINGS: BRAIN AND EXTRA-AXIAL SPACES: Brain stimulator electrodes identified. No intracranial hemorrhage, midline shift, or uncal herniation. There are no areas of abnormal parenchymal attenuation. There are no abnormal extra-axial fluid collections. CALVARIUM: Postsurgical changes involving right frontoparietal calvarium. SINUSES: Clear. OTHER: None. _______________     1. No acute intracranial abnormalities.     CT CHEST ABD PELV W CONT    Result Date: 7/4/2022  EXAM: CT of the chest, abdomen, and pelvis CLINICAL INDICATION/HISTORY: Seizure, chest pain, abdominal pain COMPARISON: None. TECHNIQUE: Axial CT imaging of the chest, abdomen, and pelvis was performed with intravenous contrast. Multiplanar reformats were generated. One or more dose reduction techniques were used on this CT: automated exposure control, adjustment of the mAs and/or kVp according to patient size, and iterative reconstruction techniques. The specific techniques used on this CT exam have been documented in the patient's electronic medical record. Digital Imaging and Communications in Medicine (DICOM) format image data are available to nonaffiliated external healthcare facilities or entities on a secured, media free, reciprocally searchable basis with patient authorization for at least a 12-month period after this study. _______________ FINDINGS: CHEST: LUNGS: Patchy atelectasis and/or infiltrate in the lower lobes. AIRWAY: Endotracheal tube is in place. Secretions identified in the airway. PLEURA: Normal, with no effusion or pneumothorax. MEDIASTINUM: Normal heart size. No pericardial effusion. Vasculature is unremarkable. LYMPH NODES: No enlarged lymph nodes. OTHER: None. =============== ABDOMEN/PELVIS: LIVER, BILIARY: Liver is unremarkable. No biliary dilation. Gallbladder is unremarkable. PANCREAS: Unremarkable. SPLEEN: Unremarkable. ADRENALS: Unremarkable. KIDNEYS/URETERS/BLADDER: Unremarkable. PELVIC ORGANS: Unremarkable. LYMPH NODES: No enlarged lymph nodes. GASTROINTESTINAL TRACT: No bowel dilation or wall thickening. VASCULATURE: Unremarkable. BONES: No acute or aggressive osseous abnormalities identified. OTHER: None. _______________     1. No traumatic injury identified. 2. Bilateral lower lobe opacities could relate to atelectasis, infiltrate, and/or aspiration. CT SPINE CERV WO CONT    Result Date: 7/4/2022  EXAM: CT cervical spine CLINICAL INDICATION/HISTORY: Trauma, neck pain. COMPARISON: None.  TECHNIQUE: Axial CT imaging of the cervical spine was performed from the skull base to the thoracic inlet without intravenous contrast. Multiplanar reformats were generated. One or more dose reduction techniques were used on this CT: automated exposure control, adjustment of the mAs and/or kVp according to patient size, and iterative reconstruction techniques. The specific techniques used on this CT exam have been documented in the patient's electronic medical record. Digital Imaging and Communications in Medicine (DICOM) format image data are available to nonaffiliated external healthcare facilities or entities on a secured, media free, reciprocally searchable basis with patient authorization for at least a 12-month period after this study. _______________ FINDINGS: VERTEBRAE AND DISCS: Alignment and vertebral height is maintained with no fracture or traumatic subluxation identified. SPINAL CANAL AND FORAMINA: Patent without significant bony canal or foramina encroachment. PREVERTEBRAL SOFT TISSUES: No prevertebral soft tissue swelling. VISIBLE PORTIONS OF POSTERIOR FOSSA/BRAIN: Unremarkable. LUNG APICES: Clear. OTHER: None. _______________     1. No cervical spine fracture. XR CHEST PORT    Result Date: 7/5/2022  EXAM: XR CHEST PORT CLINICAL INDICATION/HISTORY: On ventilator -Additional: None COMPARISON: One day prior TECHNIQUE: Portable frontal view of the chest _______________ FINDINGS: SUPPORT DEVICES: Endotracheal and enteric tubes unchanged. HEART AND MEDIASTINUM: Cardiomediastinal silhouette within normal limits. LUNGS AND PLEURAL SPACES: No dense consolidation, large effusion or pneumothorax. _______________     No acute cardiopulmonary abnormality. XR CHEST PORT    Result Date: 7/4/2022  EXAM: CHEST RADIOGRAPH CLINICAL INDICATION/HISTORY: pain   > Additional: Chest pain COMPARISON: None TECHNIQUE: Portable frontal view of the chest _______________ FINDINGS: SUPPORT DEVICES: Endotracheal tube is in good position.  HEART AND MEDIASTINUM: Heart size is normal. LUNGS AND PLEURAL SPACES: Bilateral basilar atelectasis and or infiltrate. No pleural effusion or pneumothorax. BONES AND SOFT TISSUES: Stimulator generator projects over the left chest. _______________     1. Endotracheal tube is in good position. 2. Bilateral basilar atelectasis and/or infiltrate. ASSESSMENT/IMPRESSION:   60-year-old male with longstanding refractory seizures status post VNS and bilateral temporal RNS placement presents with seizure and postictal confusion. He is intubated for airway protection. Head CT is normal.  1. Epilepsy:  Status epilepticus has resolved.     RECOMMENDATIONS:  1. Continue Depakote 1000 mg twice daily, Dilantin 100 mg 3 times daily and Vimpat 200 mg twice daily. Restart oral Zonegran 300 mg nightly and Klonopin 0.5 mg nightly after extubation. 2. Extubation if possible.        I will follow the patient. Please do not hesitate to return with any questions. Signed:   Umm Montiel MD  7/5/2022  10:19 AM

## 2022-07-06 ENCOUNTER — APPOINTMENT (OUTPATIENT)
Dept: GENERAL RADIOLOGY | Age: 27
DRG: 053 | End: 2022-07-06
Attending: INTERNAL MEDICINE
Payer: MEDICAID

## 2022-07-06 LAB
ALBUMIN SERPL-MCNC: 3.1 G/DL (ref 3.4–5)
ALBUMIN/GLOB SERPL: 1 {RATIO} (ref 0.8–1.7)
ALP SERPL-CCNC: 70 U/L (ref 45–117)
ALT SERPL-CCNC: 22 U/L (ref 16–61)
ANION GAP SERPL CALC-SCNC: 6 MMOL/L (ref 3–18)
ARTERIAL PATENCY WRIST A: ABNORMAL
AST SERPL-CCNC: 27 U/L (ref 10–38)
BASE DEFICIT BLD-SCNC: 0.6 MMOL/L
BDY SITE: ABNORMAL
BILIRUB DIRECT SERPL-MCNC: 0.1 MG/DL (ref 0–0.2)
BILIRUB SERPL-MCNC: 0.5 MG/DL (ref 0.2–1)
BODY TEMPERATURE: 98.8
BUN SERPL-MCNC: 10 MG/DL (ref 7–18)
BUN/CREAT SERPL: 9 (ref 12–20)
CA-I SERPL-SCNC: 1.07 MMOL/L (ref 1.12–1.32)
CA-I SERPL-SCNC: 1.23 MMOL/L (ref 1.12–1.32)
CALCIUM SERPL-MCNC: 8.1 MG/DL (ref 8.5–10.1)
CHLORIDE SERPL-SCNC: 113 MMOL/L (ref 100–111)
CO2 SERPL-SCNC: 24 MMOL/L (ref 21–32)
CREAT SERPL-MCNC: 1.11 MG/DL (ref 0.6–1.3)
ERYTHROCYTE [DISTWIDTH] IN BLOOD BY AUTOMATED COUNT: 11.9 % (ref 11.6–14.5)
GAS FLOW.O2 O2 DELIVERY SYS: ABNORMAL L/MIN
GAS FLOW.O2 SETTING OXYMISER: 15 BPM
GLOBULIN SER CALC-MCNC: 3.1 G/DL (ref 2–4)
GLUCOSE BLD STRIP.AUTO-MCNC: 86 MG/DL (ref 70–110)
GLUCOSE BLD STRIP.AUTO-MCNC: 87 MG/DL (ref 70–110)
GLUCOSE BLD STRIP.AUTO-MCNC: 88 MG/DL (ref 70–110)
GLUCOSE BLD STRIP.AUTO-MCNC: 96 MG/DL (ref 70–110)
GLUCOSE SERPL-MCNC: 103 MG/DL (ref 74–99)
HCO3 BLD-SCNC: 23.6 MMOL/L (ref 22–26)
HCT VFR BLD AUTO: 41.4 % (ref 36–48)
HGB BLD-MCNC: 14.4 G/DL (ref 13–16)
INSPIRATION.DURATION SETTING TIME VENT: 1 SEC
MAGNESIUM SERPL-MCNC: 2.2 MG/DL (ref 1.6–2.6)
MCH RBC QN AUTO: 32.8 PG (ref 24–34)
MCHC RBC AUTO-ENTMCNC: 34.8 G/DL (ref 31–37)
MCV RBC AUTO: 94.3 FL (ref 78–100)
NRBC # BLD: 0 K/UL (ref 0–0.01)
NRBC BLD-RTO: 0 PER 100 WBC
O2/TOTAL GAS SETTING VFR VENT: 30 %
PAW @ MEAN EXP FLOW ON VENT: 7.9 CMH2O
PCO2 BLD: 36.9 MMHG (ref 35–45)
PEEP RESPIRATORY: 5 CMH2O
PH BLD: 7.41 [PH] (ref 7.35–7.45)
PIP ISTAT,IPIP: 15
PLATELET # BLD AUTO: 149 K/UL (ref 135–420)
PMV BLD AUTO: 12.9 FL (ref 9.2–11.8)
PO2 BLD: 137 MMHG (ref 80–100)
POTASSIUM SERPL-SCNC: 4.4 MMOL/L (ref 3.5–5.5)
PROT SERPL-MCNC: 6.2 G/DL (ref 6.4–8.2)
RBC # BLD AUTO: 4.39 M/UL (ref 4.35–5.65)
SAO2 % BLD: 99.1 % (ref 92–97)
SERVICE CMNT-IMP: ABNORMAL
SODIUM SERPL-SCNC: 143 MMOL/L (ref 136–145)
SPECIMEN TYPE: ABNORMAL
TOTAL RESP. RATE, ITRR: 15
VENTILATION MODE VENT: ABNORMAL
VT SETTING VENT: 450 ML
WBC # BLD AUTO: 9.2 K/UL (ref 4.6–13.2)

## 2022-07-06 PROCEDURE — 82330 ASSAY OF CALCIUM: CPT

## 2022-07-06 PROCEDURE — 82803 BLOOD GASES ANY COMBINATION: CPT

## 2022-07-06 PROCEDURE — 74011250636 HC RX REV CODE- 250/636: Performed by: FAMILY MEDICINE

## 2022-07-06 PROCEDURE — 74011250636 HC RX REV CODE- 250/636: Performed by: INTERNAL MEDICINE

## 2022-07-06 PROCEDURE — 87077 CULTURE AEROBIC IDENTIFY: CPT

## 2022-07-06 PROCEDURE — 65610000006 HC RM INTENSIVE CARE

## 2022-07-06 PROCEDURE — 74011000258 HC RX REV CODE- 258: Performed by: INTERNAL MEDICINE

## 2022-07-06 PROCEDURE — 94640 AIRWAY INHALATION TREATMENT: CPT

## 2022-07-06 PROCEDURE — 74011000250 HC RX REV CODE- 250: Performed by: PSYCHIATRY & NEUROLOGY

## 2022-07-06 PROCEDURE — 74011000258 HC RX REV CODE- 258: Performed by: PSYCHIATRY & NEUROLOGY

## 2022-07-06 PROCEDURE — 80053 COMPREHEN METABOLIC PANEL: CPT

## 2022-07-06 PROCEDURE — 36600 WITHDRAWAL OF ARTERIAL BLOOD: CPT

## 2022-07-06 PROCEDURE — 71045 X-RAY EXAM CHEST 1 VIEW: CPT

## 2022-07-06 PROCEDURE — 82248 BILIRUBIN DIRECT: CPT

## 2022-07-06 PROCEDURE — 36415 COLL VENOUS BLD VENIPUNCTURE: CPT

## 2022-07-06 PROCEDURE — 74011250637 HC RX REV CODE- 250/637: Performed by: PSYCHIATRY & NEUROLOGY

## 2022-07-06 PROCEDURE — 74011250636 HC RX REV CODE- 250/636: Performed by: PSYCHIATRY & NEUROLOGY

## 2022-07-06 PROCEDURE — 74011000250 HC RX REV CODE- 250: Performed by: INTERNAL MEDICINE

## 2022-07-06 PROCEDURE — 94003 VENT MGMT INPAT SUBQ DAY: CPT

## 2022-07-06 PROCEDURE — 85027 COMPLETE CBC AUTOMATED: CPT

## 2022-07-06 PROCEDURE — 74011250636 HC RX REV CODE- 250/636: Performed by: EMERGENCY MEDICINE

## 2022-07-06 PROCEDURE — 83735 ASSAY OF MAGNESIUM: CPT

## 2022-07-06 PROCEDURE — 87070 CULTURE OTHR SPECIMN AEROBIC: CPT

## 2022-07-06 PROCEDURE — C9254 INJECTION, LACOSAMIDE: HCPCS | Performed by: PSYCHIATRY & NEUROLOGY

## 2022-07-06 PROCEDURE — C9113 INJ PANTOPRAZOLE SODIUM, VIA: HCPCS | Performed by: INTERNAL MEDICINE

## 2022-07-06 PROCEDURE — 77010033678 HC OXYGEN DAILY

## 2022-07-06 PROCEDURE — 87186 SC STD MICRODIL/AGAR DIL: CPT

## 2022-07-06 PROCEDURE — 82962 GLUCOSE BLOOD TEST: CPT

## 2022-07-06 RX ORDER — VALPROIC ACID 250 MG/5ML
1000 SOLUTION ORAL EVERY 12 HOURS
Status: DISCONTINUED | OUTPATIENT
Start: 2022-07-06 | End: 2022-07-07

## 2022-07-06 RX ORDER — PHENYTOIN SODIUM 50 MG/ML
100 INJECTION, SOLUTION INTRAMUSCULAR; INTRAVENOUS 2 TIMES DAILY
Status: DISCONTINUED | OUTPATIENT
Start: 2022-07-06 | End: 2022-07-07

## 2022-07-06 RX ORDER — CALCIUM GLUCONATE 20 MG/ML
2 INJECTION, SOLUTION INTRAVENOUS ONCE
Status: COMPLETED | OUTPATIENT
Start: 2022-07-06 | End: 2022-07-06

## 2022-07-06 RX ADMIN — 0.12% CHLORHEXIDINE GLUCONATE 10 ML: 1.2 RINSE ORAL at 21:45

## 2022-07-06 RX ADMIN — CALCIUM GLUCONATE 2 G: 20 INJECTION, SOLUTION INTRAVENOUS at 08:36

## 2022-07-06 RX ADMIN — MIDAZOLAM 5 MG/HR: 5 INJECTION INTRAMUSCULAR; INTRAVENOUS at 13:15

## 2022-07-06 RX ADMIN — VALPROIC ACID 1000 MG: 250 SOLUTION ORAL at 21:45

## 2022-07-06 RX ADMIN — SODIUM CHLORIDE 3 G: 900 INJECTION INTRAVENOUS at 23:34

## 2022-07-06 RX ADMIN — SODIUM CHLORIDE 40 MG: 9 INJECTION, SOLUTION INTRAMUSCULAR; INTRAVENOUS; SUBCUTANEOUS at 10:35

## 2022-07-06 RX ADMIN — SODIUM CHLORIDE 3 G: 900 INJECTION INTRAVENOUS at 13:25

## 2022-07-06 RX ADMIN — PROPOFOL 45 MCG/KG/MIN: 10 INJECTION, EMULSION INTRAVENOUS at 00:06

## 2022-07-06 RX ADMIN — SODIUM CHLORIDE 200 MG: 9 INJECTION, SOLUTION INTRAVENOUS at 22:00

## 2022-07-06 RX ADMIN — ALBUTEROL SULFATE 2.5 MG: 2.5 SOLUTION RESPIRATORY (INHALATION) at 20:03

## 2022-07-06 RX ADMIN — PHENYTOIN SODIUM 100 MG: 50 INJECTION INTRAMUSCULAR; INTRAVENOUS at 05:13

## 2022-07-06 RX ADMIN — ARFORMOTEROL TARTRATE 15 MCG: 15 SOLUTION RESPIRATORY (INHALATION) at 20:03

## 2022-07-06 RX ADMIN — ARFORMOTEROL TARTRATE 15 MCG: 15 SOLUTION RESPIRATORY (INHALATION) at 07:31

## 2022-07-06 RX ADMIN — SODIUM CHLORIDE 3 G: 900 INJECTION INTRAVENOUS at 05:13

## 2022-07-06 RX ADMIN — PROPOFOL 45 MCG/KG/MIN: 10 INJECTION, EMULSION INTRAVENOUS at 05:12

## 2022-07-06 RX ADMIN — PROPOFOL 45 MCG/KG/MIN: 10 INJECTION, EMULSION INTRAVENOUS at 09:36

## 2022-07-06 RX ADMIN — MIDAZOLAM HYDROCHLORIDE 4 MG: 1 INJECTION, SOLUTION INTRAMUSCULAR; INTRAVENOUS at 02:32

## 2022-07-06 RX ADMIN — PROPOFOL 40 MCG/KG/MIN: 10 INJECTION, EMULSION INTRAVENOUS at 23:34

## 2022-07-06 RX ADMIN — VALPROATE SODIUM 1000 MG: 100 INJECTION, SOLUTION INTRAVENOUS at 10:35

## 2022-07-06 RX ADMIN — PHENYTOIN SODIUM 100 MG: 50 INJECTION INTRAMUSCULAR; INTRAVENOUS at 16:53

## 2022-07-06 RX ADMIN — DEXTROSE MONOHYDRATE AND SODIUM CHLORIDE 150 ML/HR: 5; .45 INJECTION, SOLUTION INTRAVENOUS at 08:41

## 2022-07-06 RX ADMIN — PROPOFOL 45 MCG/KG/MIN: 10 INJECTION, EMULSION INTRAVENOUS at 14:33

## 2022-07-06 RX ADMIN — SODIUM CHLORIDE 3 G: 900 INJECTION INTRAVENOUS at 18:30

## 2022-07-06 RX ADMIN — SODIUM CHLORIDE 200 MG: 9 INJECTION, SOLUTION INTRAVENOUS at 10:35

## 2022-07-06 RX ADMIN — ENOXAPARIN SODIUM 40 MG: 100 INJECTION SUBCUTANEOUS at 13:25

## 2022-07-06 RX ADMIN — PROPOFOL 45 MCG/KG/MIN: 10 INJECTION, EMULSION INTRAVENOUS at 19:06

## 2022-07-06 RX ADMIN — 0.12% CHLORHEXIDINE GLUCONATE 10 ML: 1.2 RINSE ORAL at 08:39

## 2022-07-06 NOTE — ROUTINE PROCESS
1900: Bedside and Verbal shift change report given to CONSTANCE Montes RN (oncoming nurse) by Redd Cueto RN (offgoing nurse). Report included the following information SBAR, Kardex, ED Summary, Intake/Output, MAR, Recent Results, Med Rec Status and Cardiac Rhythm NSR.     2000: Shift assessment completed per flow sheet. No seizure like activity noted; intubated and sedated with restratints in place for patient safety; will continue to monitor. 2240: Patient with seizure like activity; head and upper body shaking; eyes open and upward; activity lasted one minute; will continue to monitor. 0000: Shift reassessment completed without change. 0235 Seizure activity with head and upper body shaking that is barely noticeable; gave PRN Versed as stated per [facility]; seizure activity ended; will continue to monitor. 0400: Reassessment completed per flow sheet without change.

## 2022-07-06 NOTE — PROGRESS NOTES
Problem: Ventilator Management  Goal: *Adequate oxygenation and ventilation  Outcome: Progressing Towards Goal  Goal: *Patient maintains clear airway/free of aspiration  Outcome: Progressing Towards Goal  Goal: *Absence of infection signs and symptoms  Outcome: Progressing Towards Goal  Goal: *Normal spontaneous ventilation  Outcome: Progressing Towards Goal     Problem: Patient Education: Go to Patient Education Activity  Goal: Patient/Family Education  Outcome: Progressing Towards Goal     Problem: Non-Violent Restraints  Goal: Removal from restraints as soon as assessed to be safe  Outcome: Progressing Towards Goal  Goal: No harm/injury to patient while restraints in use  Outcome: Progressing Towards Goal  Goal: Patient's dignity will be maintained  Outcome: Progressing Towards Goal  Goal: Patient Interventions  Outcome: Progressing Towards Goal     Problem: Falls - Risk of  Goal: *Absence of Falls  Description: Document [place] Fall Risk and appropriate interventions in the flowsheet.   Outcome: Progressing Towards Goal  Note: Fall Risk Interventions:       Mentation Interventions: Adequate sleep, hydration, pain control,Bed/chair exit alarm,Door open when patient unattended,Evaluate medications/consider consulting pharmacy,Familiar objects from home,Reorient patient,Room close to nurse's station,Update white board    Medication Interventions: Assess postural VS orthostatic hypotension,Evaluate medications/consider consulting pharmacy,Bed/chair exit alarm    Elimination Interventions: Bed/chair exit alarm,Call light in reach,Toileting schedule/hourly rounds    History of Falls Interventions: Bed/chair exit alarm,Vital signs minimum Q4HRs X 24 hrs (comment for end date),Assess for delayed presentation/identification of injury for 48 hrs (comment for end date),Door open when patient unattended         Problem: Patient Education: Go to Patient Education Activity  Goal: Patient/Family Education  Outcome: Progressing Towards Goal     Problem: Patient Education: Go to Patient Education Activity  Goal: Patient/Family Education  Outcome: Progressing Towards Goal

## 2022-07-06 NOTE — PROGRESS NOTES
NEUROLOGY PROGRESS NOTE        Patient: Ynes Morin        Sex: male          DOA: 7/4/2022  YOB: 1995      Age:  33 y.o.         LOS: 2 days     Identification:  [id number] male with past medical history of refractory seizure presents with multiple seizures. SUBJECTIVE:   No recurrent seizure overnight. Extubation is tried today. REVIEW OF SYSTEMS: Denies chest pain, abdominal pain, nausea or vomiting. No fever or chills. OBJECTIVE:      Visit Vitals  BP (!) 142/82   Pulse 74   Temp 99.1 °F (37.3 °C)   Resp 15   Ht 5' 5.98\" (1.676 m)   Wt 81.6 kg (179 lb 14.3 oz)   SpO2 98%   BMI 29.05 kg/m²     Physical Exam:  GEN: Alert, NAD  EYES: conjunctiva normal, lids with out lesions  HEENT: MMM. HEART: RRR +S1 +S2  LUNGS: CTA B/L no rales or rhonchi. ABDOMEN: Soft, non-tender. EXTREMITIES: No edema cyanosis  SKIN: no rashes or skin breakdown, no nodules  NEURO: Intubated and sedated. PERRLA, no spontaneous movement of all 4 extremities. No pain withdrawal.  DTR absent throughout. Bilateral toes are mute.     Current Facility-Administered Medications   Medication Dose Route Frequency    insulin lispro (HUMALOG) injection   SubCUTAneous Q6H    glucose chewable tablet 16 g  4 Tablet Oral PRN    glucagon (GLUCAGEN) injection 1 mg  1 mg IntraMUSCular PRN    dextrose 10% infusion 0-250 mL  0-250 mL IntraVENous PRN    dextrose 5 % - 0.45% NaCl infusion  25 mL/hr IntraVENous CONTINUOUS    arformoteroL (BROVANA) neb solution 15 mcg  15 mcg Nebulization BID RT    ELECTROLYTE REPLACEMENT PROTOCOL - Potassium Standard Dosing   1 Each Other PRN    ELECTROLYTE REPLACEMENT PROTOCOL - Magnesium   1 Each Other PRN    ELECTROLYTE REPLACEMENT PROTOCOL - Phosphorus  Standard Dosing  1 Each Other PRN    ELECTROLYTE REPLACEMENT PROTOCOL - Calcium   1 Each Other PRN    ampicillin-sulbactam (UNASYN) 3 g in 0.9% sodium chloride (MBP/ADV) 100 mL MBP  3 g IntraVENous Q6H    acetaminophen (TYLENOL) solution 650 mg  650 mg Per G Tube Q6H PRN    [Held by provider] propofol (DIPRIVAN) 10 mg/mL infusion  0-50 mcg/kg/min IntraVENous TITRATE    midazolam in normal saline (VERSED) 1 mg/mL infusion  0-5 mg/hr IntraVENous TITRATE    midazolam (VERSED) injection 1 mg  1 mg IntraVENous Q2H PRN    fentaNYL citrate (PF) injection 25 mcg  25 mcg IntraVENous Q4H PRN    chlorhexidine (PERIDEX) 0.12 % mouthwash 10 mL  10 mL Oral Q12H    pantoprazole (PROTONIX) 40 mg in 0.9% sodium chloride 10 mL injection  40 mg IntraVENous Q24H    [Held by provider] 0.9% sodium chloride infusion  100 mL/hr IntraVENous CONTINUOUS    enoxaparin (LOVENOX) injection 40 mg  40 mg SubCUTAneous Q24H    albuterol CONCENTRATE 2.5mg/0.5 mL neb soln  2.5 mg Nebulization Q4H PRN    fentaNYL (PF) 900 mcg/30 ml infusion soln  0-50 mcg/hr IntraVENous TITRATE    valproate (DEPACON) 1,000 mg in 0.9% sodium chloride 50 mL IVPB  1 g IntraVENous BID    lacosamide (VIMPAT) 200 mg in 0.9% sodium chloride 100 mL IVPB  200 mg IntraVENous BID    midazolam (VERSED) injection 4 mg  4 mg IntraVENous Q4H PRN    phenytoin (DILANTIN) injection 100 mg  100 mg IntraVENous Q8H       Laboratory  Recent Results (from the past 24 hour(s))   GLUCOSE, POC    Collection Time: 07/05/22  5:00 PM   Result Value Ref Range    Glucose (POC) 61 (L) 70 - 110 mg/dL   GLUCOSE, POC    Collection Time: 07/05/22  5:04 PM   Result Value Ref Range    Glucose (POC) 73 70 - 110 mg/dL   GLUCOSE, POC    Collection Time: 07/05/22 11:37 PM   Result Value Ref Range    Glucose (POC) 90 70 - 110 mg/dL   CALCIUM, IONIZED    Collection Time: 07/06/22  5:07 AM   Result Value Ref Range    Ionized Calcium 1.07 (L) 1.12 - 1.32 MMOL/L   CBC W/O DIFF    Collection Time: 07/06/22  5:07 AM   Result Value Ref Range    WBC 9.2 4.6 - 13.2 K/uL    RBC 4.39 4.35 - 5.65 M/uL    HGB 14.4 13.0 - 16.0 g/dL    HCT 41.4 36.0 - 48.0 %    MCV 94.3 78.0 - 100.0 FL    MCH 32.8 24.0 - 34.0 PG    MCHC 34.8 31.0 - 37.0 g/dL    RDW 11.9 11.6 - 14.5 %    PLATELET 263 155 - 531 K/uL    MPV 12.9 (H) 9.2 - 11.8 FL    NRBC 0.0 0  WBC    ABSOLUTE NRBC 0.00 0.00 - 8.54 K/uL   METABOLIC PANEL, COMPREHENSIVE    Collection Time: 07/06/22  5:07 AM   Result Value Ref Range    Sodium 143 136 - 145 mmol/L    Potassium 4.4 3.5 - 5.5 mmol/L    Chloride 113 (H) 100 - 111 mmol/L    CO2 24 21 - 32 mmol/L    Anion gap 6 3.0 - 18 mmol/L    Glucose 103 (H) 74 - 99 mg/dL    BUN 10 7.0 - 18 MG/DL    Creatinine 1.11 0.6 - 1.3 MG/DL    BUN/Creatinine ratio 9 (L) 12 - 20      GFR est AA >60 >60 ml/min/1.73m2    GFR est non-AA >60 >60 ml/min/1.73m2    Calcium 8.1 (L) 8.5 - 10.1 MG/DL    Bilirubin, total 0.5 0.2 - 1.0 MG/DL    ALT (SGPT) 22 16 - 61 U/L    AST (SGOT) 27 10 - 38 U/L    Alk. phosphatase 70 45 - 117 U/L    Protein, total 6.2 (L) 6.4 - 8.2 g/dL    Albumin 3.1 (L) 3.4 - 5.0 g/dL    Globulin 3.1 2.0 - 4.0 g/dL    A-G Ratio 1.0 0.8 - 1.7     MAGNESIUM    Collection Time: 07/06/22  5:07 AM   Result Value Ref Range    Magnesium 2.2 1.6 - 2.6 mg/dL   BILIRUBIN, DIRECT    Collection Time: 07/06/22  5:07 AM   Result Value Ref Range    Bilirubin, direct 0.1 0.0 - 0.2 MG/DL   GLUCOSE, POC    Collection Time: 07/06/22  5:23 AM   Result Value Ref Range    Glucose (POC) 96 70 - 110 mg/dL   BLOOD GAS, ARTERIAL POC    Collection Time: 07/06/22  5:43 AM   Result Value Ref Range    Device: ADULT VENT      FIO2 (POC) 30 %    pH (POC) 7.41 7.35 - 7.45      pCO2 (POC) 36.9 35.0 - 45.0 MMHG    pO2 (POC) 137 (H) 80 - 100 MMHG    HCO3 (POC) 23.6 22 - 26 MMOL/L    sO2 (POC) 99.1 (H) 92 - 97 %    Base deficit (POC) 0.6 mmol/L    Mode ASSIST CONTROL      Tidal volume 450 ml    Set Rate 15 bpm    PEEP/CPAP (POC) 5 cmH2O    Mean Airway Pressure 7.9 cmH2O    PIP (POC) 15      Allens test (POC) NOT APPLICABLE      Inspiratory Time 1 sec    Total resp.  rate 15      Site LEFT RADIAL      Patient temp. 98.8      Specimen type (POC) ARTERIAL      Performed by Caba Sandy    GLUCOSE, POC    Collection Time: 07/06/22 11:41 AM   Result Value Ref Range    Glucose (POC) 87 70 - 110 mg/dL       Radiology:  XR WRIST LT AP/LAT/OBL MIN 3V    Result Date: 7/4/2022  EXAM: 3 views left wrist CLINICAL INDICATION/HISTORY: Left wrist pain COMPARISON: None. _______________ FINDINGS: No fracture or dislocation. Joint spaces are maintained. _______________     1. No fracture or dislocation. XR ABD (KUB)    Result Date: 7/4/2022  EXAM: Frontal view of the abdomen CLINICAL INDICATION/HISTORY: OG tube placement COMPARISON: None. _______________ FINDINGS: NG/OG tube is in place with the tip in the right abdomen in the region of the gastric antrum. No bowel obstruction identified. _______________     1. NG/OG tube is in good position. CT HEAD WO CONT    Result Date: 7/4/2022  EXAM: CT head CLINICAL INDICATION/HISTORY: Fall, seizure COMPARISON: None. TECHNIQUE: Axial CT imaging of the head was performed without intravenous contrast. One or more dose reduction techniques were used on this CT: automated exposure control, adjustment of the mAs and/or kVp according to patient size, and iterative reconstruction techniques. The specific techniques used on this CT exam have been documented in the patient's electronic medical record. Digital Imaging and Communications in Medicine (DICOM) format image data are available to nonaffiliated external healthcare facilities or entities on a secured, media free, reciprocally searchable basis with patient authorization for at least a 12-month period after this study. _______________ FINDINGS: BRAIN AND EXTRA-AXIAL SPACES: Brain stimulator electrodes identified. No intracranial hemorrhage, midline shift, or uncal herniation. There are no areas of abnormal parenchymal attenuation. There are no abnormal extra-axial fluid collections. CALVARIUM: Postsurgical changes involving right frontoparietal calvarium. SINUSES: Clear.  OTHER: None. _______________ 1. No acute intracranial abnormalities. CT CHEST ABD PELV W CONT    Result Date: 7/4/2022  EXAM: CT of the chest, abdomen, and pelvis CLINICAL INDICATION/HISTORY: Seizure, chest pain, abdominal pain COMPARISON: None. TECHNIQUE: Axial CT imaging of the chest, abdomen, and pelvis was performed with intravenous contrast. Multiplanar reformats were generated. One or more dose reduction techniques were used on this CT: automated exposure control, adjustment of the mAs and/or kVp according to patient size, and iterative reconstruction techniques. The specific techniques used on this CT exam have been documented in the patient's electronic medical record. Digital Imaging and Communications in Medicine (DICOM) format image data are available to nonaffiliated external healthcare facilities or entities on a secured, media free, reciprocally searchable basis with patient authorization for at least a 12-month period after this study. _______________ FINDINGS: CHEST: LUNGS: Patchy atelectasis and/or infiltrate in the lower lobes. AIRWAY: Endotracheal tube is in place. Secretions identified in the airway. PLEURA: Normal, with no effusion or pneumothorax. MEDIASTINUM: Normal heart size. No pericardial effusion. Vasculature is unremarkable. LYMPH NODES: No enlarged lymph nodes. OTHER: None. =============== ABDOMEN/PELVIS: LIVER, BILIARY: Liver is unremarkable. No biliary dilation. Gallbladder is unremarkable. PANCREAS: Unremarkable. SPLEEN: Unremarkable. ADRENALS: Unremarkable. KIDNEYS/URETERS/BLADDER: Unremarkable. PELVIC ORGANS: Unremarkable. LYMPH NODES: No enlarged lymph nodes. GASTROINTESTINAL TRACT: No bowel dilation or wall thickening. VASCULATURE: Unremarkable. BONES: No acute or aggressive osseous abnormalities identified. OTHER: None. _______________     1. No traumatic injury identified. 2. Bilateral lower lobe opacities could relate to atelectasis, infiltrate, and/or aspiration.      CT SPINE CERV WO CONT    Result Date: 7/4/2022  EXAM: CT cervical spine CLINICAL INDICATION/HISTORY: Trauma, neck pain. COMPARISON: None. TECHNIQUE: Axial CT imaging of the cervical spine was performed from the skull base to the thoracic inlet without intravenous contrast. Multiplanar reformats were generated. One or more dose reduction techniques were used on this CT: automated exposure control, adjustment of the mAs and/or kVp according to patient size, and iterative reconstruction techniques. The specific techniques used on this CT exam have been documented in the patient's electronic medical record. Digital Imaging and Communications in Medicine (DICOM) format image data are available to nonaffiliated external healthcare facilities or entities on a secured, media free, reciprocally searchable basis with patient authorization for at least a 12-month period after this study. _______________ FINDINGS: VERTEBRAE AND DISCS: Alignment and vertebral height is maintained with no fracture or traumatic subluxation identified. SPINAL CANAL AND FORAMINA: Patent without significant bony canal or foramina encroachment. PREVERTEBRAL SOFT TISSUES: No prevertebral soft tissue swelling. VISIBLE PORTIONS OF POSTERIOR FOSSA/BRAIN: Unremarkable. LUNG APICES: Clear. OTHER: None. _______________     1. No cervical spine fracture. XR CHEST PORT    Result Date: 7/5/2022  EXAM: XR CHEST PORT CLINICAL INDICATION/HISTORY: On ventilator -Additional: None COMPARISON: One day prior TECHNIQUE: Portable frontal view of the chest _______________ FINDINGS: SUPPORT DEVICES: Endotracheal and enteric tubes unchanged. HEART AND MEDIASTINUM: Cardiomediastinal silhouette within normal limits. LUNGS AND PLEURAL SPACES: No dense consolidation, large effusion or pneumothorax. _______________     No acute cardiopulmonary abnormality.     XR CHEST PORT    Result Date: 7/4/2022  EXAM: CHEST RADIOGRAPH CLINICAL INDICATION/HISTORY: pain   > Additional: Chest pain COMPARISON: None TECHNIQUE: Portable frontal view of the chest _______________ FINDINGS: SUPPORT DEVICES: Endotracheal tube is in good position. HEART AND MEDIASTINUM: Heart size is normal. LUNGS AND PLEURAL SPACES: Bilateral basilar atelectasis and or infiltrate. No pleural effusion or pneumothorax. BONES AND SOFT TISSUES: Stimulator generator projects over the left chest. _______________     1. Endotracheal tube is in good position. 2. Bilateral basilar atelectasis and/or infiltrate. ASSESSMENT/IMPRESSION:   27-year-old male with longstanding refractory seizures status post VNS and bilateral temporal RNS placement presents with seizure and postictal confusion. He is intubated for airway protection. Head CT is normal.  1. Epilepsy:  Status epilepticus has resolved.     RECOMMENDATIONS:  1. Continue Depakote 1000 mg twice daily and Vimpat 200 mg twice daily. Decrease Dilantin to 100 mg twice daily. Restart oral Zonegran 300 mg nightly and Klonopin 0.5 mg nightly after extubation. Plan is discussed with mother over the phone.        I will follow the patient. Please do not hesitate to return with any questions. Signed:   Lashonda Rubi MD  7/6/2022  10:19 AM

## 2022-07-06 NOTE — PROGRESS NOTES
Comprehensive Nutrition Assessment    Type and Reason for Visit: Initial    Nutrition Recommendations/Plan:   Will order tube feeding. Initiate feeding to meet nutritional needs. Monitor propofol rate and will adjust feeding as needed. 07/06/22 1029   Enteral Nutrition   EN Formula Peptide based high protein   Schedule Continuous   Feeding Regimen Start @ 10ml/hr. Increase by 10 ml q6. Goal: TwoCal @ 25ml/hr + 3 prosource daily. Additives/Modulars Protein  (3 prosource (180kcal, 45g PRO))   Water Flushes 150ml q4   Goal EN & Flush Order Provides TwoCal @ 25ml/hr + 3 prosource: 1280kcals, 91g PRO, 120g CHO, 385ml free water    + propofol @ 22ml/hr provides 581kcal (1861kcal total)        Malnutrition Assessment:  Malnutrition Status:  Insufficient data (07/06/22 1029)      Nutrition Assessment:    Admitted with acute respiratory failure, Status epilepticus. Pt in ICU on vent. Plan to start tube feeding per MD- will order. Nutrition Related Findings:    Labs and meds - protonix. Propofol @ 22ml/hr. NG tube in place. Wound Type: None    Current Nutrition Intake & Therapies:  Average Meal Intake: NPO     DIET NPO  Additional Calorie Sources:  Propofol @ 22ml/hr (581kcal)    Anthropometric Measures:  Height: 5' 5.98\" (167.6 cm)  Ideal Body Weight (IBW): 142 lbs (65 kg)  Admission Body Weight: 179 lb 14.3 oz  Current Body Wt:  81.6 kg (179 lb 14.3 oz), 126.7 % IBW. Bed scale  Current BMI (kg/m2): 29  Usual Body Weight: 84.8 kg (187 lb) (4/30/22)  % Weight Change (Calculated): -3.8  Weight Adjustment: No adjustment                 BMI Category: Overweight (BMI 25.0-29. 9)    Estimated Daily Nutrient Needs:  Energy Requirements Based On: Formula  Weight Used for Energy Requirements: Current  Energy (kcal/day): 1915  Weight Used for Protein Requirements: Current  Protein (g/day):   Method Used for Fluid Requirements: 1 ml/kcal  Fluid (ml/day): 1915    Nutrition Diagnosis:   Inadequate oral intake related to impaired respiratory function as evidenced by NPO or clear liquid status due to medical condition,intubation    Nutrition Interventions:   Food and/or Nutrient Delivery: Start tube feeding  Nutrition Education/Counseling: No recommendations at this time  Coordination of Nutrition Care: Continue to monitor while inpatient       Goals:     Goals: Initiate nutrition support,by next RD assessment       Nutrition Monitoring and Evaluation:   Behavioral-Environmental Outcomes: None identified  Food/Nutrient Intake Outcomes: Diet advancement/tolerance,Enteral nutrition intake/tolerance  Physical Signs/Symptoms Outcomes: Biochemical data,Hemodynamic status,Nutrition focused physical findings,Skin,Weight,GI status    Discharge Planning:     Too soon to determine    Ruth Ann Kennedy RD

## 2022-07-06 NOTE — PROGRESS NOTES
Hospitalist Progress Note    Patient: Carlos Dill MRN: [id number]  CSN: [id number]    YOB: 1995  Age: 33 y.o. Sex: male    DOA: 7/4/2022 LOS:  LOS: 2 days                Assessment/Plan     Patient Active Problem List   Diagnosis Code    Status epilepticus ([facility] [address] G40.901    Respiratory failure ([facility] [address] J96.90    Marijuana use F12.90    Hypernatremia E87.0        Chief complaint :  Seizures  33 y.o. male with seizure disorder, status post vagus nerve stimulator placement, asthma is brought to ER by EMS as he was found unresponsive by paramedics. Admitted for status epilepticus. CRITICAL CARE PLAN     Resp -   Respiratory failure -  Intubated for airway protection  See vent orders, VAP bundle. HOB>30 degrees. SBT per pulmonary.        ID -   Concern for aspiration in ER  Received clindamycin  Monitor off antibiotics     [facility] - Monitor HD.       Heme/onc - Follow H&H, plts. INR.     Renal - Trend BUN, Cr, follow I/O, hay in place. Check and replace Mg, K, phos.     Endocrine -  Follow FSG     Neuro/ Pain/ Sedation -   Status epilepticus -  Neurology following  Had seizure again after admission  On vimpat, dilantin, valproic acid.      Sedation - propofol, fentanyl.     His urine drug screen positive for THC, benzodiazepine.     GI - NPO for now. NG tube, tube feeding.     Prophylaxis - DVT:lovenox, GI: protonix     [id number]  35 minutes of critical care time spent in the direct evaluation and treatment of this high risk patient. The reason for providing this level of medical care for this critically ill patient was due a critical illness that impaired one or more vital organ systems such that there was a high probability of imminent or life threatening deterioration in the patients condition.  This care involved high complexity decision making to assess, manipulate, and support vital system functions, to treat this degreee vital organ system failure and to prevent further life threatening deterioration of the patients condition. Disposition :     Vital signs/Intake and Output:  Visit Vitals  /70   Pulse 76   Temp 98.2 °F (36.8 °C)   Resp 11   Ht 5' 5.98\" (1.676 m)   Wt 81.6 kg (179 lb 14.3 oz)   SpO2 100%   BMI 29.05 kg/m²     Current Shift:  No intake/output data recorded. Last three shifts:  07/05 0701 - 07/06 1900  In: 4902.9 [I.V.:4872.9]  Out: 3884 [Urine:4440]            Labs: Results:       Chemistry Recent Labs     07/06/22 0507 07/04/22  0630   * 205*    146*   K 4.4 4.0   * 113*   CO2 24 13*   BUN 10 19*   CREA 1.11 1.51*   CA 8.1* 9.8   AGAP 6 20*   BUCR 9* 13   AP 70  --    TP 6.2*  --    ALB 3.1*  --    GLOB 3.1  --    AGRAT 1.0  --       CBC w/Diff Recent Labs     07/06/22 0507 07/04/22  0630   WBC 9.2 10.7   RBC 4.39 4.64   HGB 14.4 14.4   HCT 41.4 45.7    205   GRANS  --  61   LYMPH  --  29   EOS  --  0      Cardiac Enzymes Recent Labs     07/04/22 0630   *      Coagulation Recent Labs     07/04/22 0630   PTP 14.3   INR 1.1   APTT 25.0       Lipid Panel No results found for: CHOL, CHOLPOCT, CHOLX, CHLST, CHOLV, 421741, HDL, HDLP, LDL, LDLC, DLDLP, 232774, VLDLC, VLDL, TGLX, TRIGL, TRIGP, TGLPOCT, CHHD, CHHDX   BNP No results for input(s): BNPP in the last 72 hours.    Liver Enzymes Recent Labs     07/06/22 0507   TP 6.2*   ALB 3.1*   AP 70      Thyroid Studies No results found for: T4, T3U, TSH, TSHEXT     Procedures/imaging: see electronic medical records for all procedures/Xrays and details which were not copied into this note but were reviewed prior to creation of Plan

## 2022-07-06 NOTE — PROGRESS NOTES
[facility]  Pulmonary, Critical Care, and Sleep Medicine    Name: Maribel Gaitan MRN: [id number]   : 1995 Hospital: [facility] [place]    Date: 2022  Room: [address] [place] [address] Note                                              Consult requesting physician: Dr. Jacinta Sauceda  Reason for Consult: Ventilator management, status epilepticus    IMPRESSION:       Audrey Mohr 1106 Problems    Diagnosis Date Noted    Status epilepticus ([facility] [address] 2022    Respiratory failure ([address] 2022    Marijuana use 2022    Hypernatremia 2022   ·      Patient Active Problem List   Diagnosis Code    Status epilepticus ([facility] [address] G40.901    Respiratory failure ([facility] [address] J96.90    Marijuana use F12.90    Hypernatremia E87.0         · Code status: Full code      RECOMMENDATIONS:     Respiratory: Intubated for airway protection due to multiple seizures, status epilepticus. History of asthma and smoker. Bronchodilators: Albuterol as needed. ABg  7.41/36/137/99  Wean sedation to extubate  I spoke to neurology no more seizures  CXR: ETT in place. Bibasilar mild atelectasis. CT chest abdomen pelvis 2022: Nil acute. Bibasilar atelectasis. _______________     IMPRESSION     1. No traumatic injury identified.     2. Bilateral lower lobe opacities could relate to atelectasis, infiltrate,  and/or aspiration. ABG with respiratory acidosis; will repeat ABG and adjust ventilator accordingly. Currently on assist control ventilator; will adjust after ABG. Sedation: Currently on propofol drip in ER; will change to Versed drip. Versed/fentanyl as needed. When seizure controlled, may consider stopping sedation and evaluate for SBT tomorrow morning. Ventilator bundle & Sedation protocol followed. Daily sedation holiday, assessment for readiness for SBT and then re-titrate if required. Chlorhexidine mouth washes. Keep SPO2 >=92%. HOB 30 degree elevation all the time.  Aggressive pulmonary toileting. Aspiration precautions. CVS:   Blood pressure mildly elevated; monitor. ID:   CXr stable   Finish short course of unasyn    Thought to have mild aspiration; but no aspiration noted at the time of intubation per Dr. Yannick Hubbard. Received clindamycin x1 in ER. No fever or leukocytosis. No signs of infection. Hematology/Oncology: Normal hemoglobin, platelets and coags. Monitor. Renal: JAVIER  Mildly elevated creatinine; cpk elevated continue hydration  Mild hypernatremia, expected to improve with IVF. GI/: No acute issues. Endocrine: Monitor BS. SSI. Neurology:  Last night there was a question of seizures but neurology evalauted stable  Wean to extubate   History of seizure with recurrent hospitalization; last [facility] hospitalization 04/2022. Multiple seizures/status epilepticus. CTA head and neck nil acute. Received Keppra in ER. Currently on propofol drip; will change to Versed drip. Will defer antiseizure medications to neurologist.  Neurology Dr. Aniya Diaz consulted from ER. Psychiatry: No acute issues. Toxicology: Urine drug screen positive for THC and benzo; patient received benzo before drug screen test.    Pain/Sedation: Sedation protocol as above. Skin/Wound: Contusion on left eyebrow and abrasion on the left upper extremity. Electrolytes: Replace electrolytes per ICU electrolyte replacement protocol. IVF: NS at 100 mL/h. Nutrition: N.p.o.    Prophylaxis: DVT Prophylaxis: SCD/Lovenox. GI Prophylaxis: Protonix. Restraints:   Wrist soft restraints for patient interfering with medical therapy/management and patient safety. Lines/Tubes: PIV  ETT: 7/4/2022. Contreras: To be placed 7/4/2022 (Medically necessary for strict input/output monitoring in critically ill patient, will remove it when not needed. Contreras bundle followed). Advance Directive/Palliative Care: Per clinical course.     Will defer respective systems problem management to primary and other respective consultant and follow patient in ICU with primary and other medical team.  Further recommendations will be based on the patient's response to recommended treatment and results of the investigation ordered. Quality Care: PPI, DVT prophylaxis, HOB elevated, Infection control all reviewed and addressed. Care of plan d/w RN, RT, MDR, Dr. Guerrero Momin, Dr. Judie Mcardle  High complexity decision making was performed during the evaluation of this patient at high risk for decompensation with multiple organ involvement. Total critical care time spent rendering care exclusive of procedures/family discussion/coordination of care: 46 minutes. Subjective/History of Present Illness:     Patient is a 33 y.o. male with PMHx significant for asthma, seizure, s/p vagal nerve stimulator, smoker, polysubstance abuse; being admitted for status epilepticus. Patient was found unresponsive by paramedics; head to seizure episodes in ER; received Keppra and benzo and intubated for airway protection due to status epilepticus. Recent [place] hospitalization for seizure in 04/2022, and at [facility] in 09/2021.    7/6/2022 :   Seen in ER room #6. Wean to extubate today  So far no seizures  CXr stable    I/O last 24 hrs: Intake/Output Summary (Last 24 hours) at 7/6/2022 1359  Last data filed at 7/6/2022 1151  Gross per 24 hour   Intake 2888.44 ml   Output 3600 ml   Net -711.56 ml         The patient is critically ill and can not provide additional history due to Ventilated    History taken from EMR     Review of Systems:  ROS not obtained due to patient factor. No Known Allergies   Past Medical History:   Diagnosis Date    Asthma     Seizure ([facility] [address]     Status post VNS (vagus nerve stimulator) placement       No past surgical history on file.    Social History     Tobacco Use    Smoking status: Not on file    Smokeless tobacco: Not on file   Substance Use Topics    Alcohol use: Not on file      No family history on file.   Prior to Admission medications    Medication Sig Start Date End Date Taking? Authorizing Provider   carBAMazepine (TEGretoL) 200 mg tablet Take 200 mg by mouth three (3) times daily.     Other, MD Ángela     Current Facility-Administered Medications   Medication Dose Route Frequency    phenytoin (DILANTIN) injection 100 mg  100 mg IntraVENous BID    insulin lispro (HUMALOG) injection   SubCUTAneous Q6H    dextrose 5 % - 0.45% NaCl infusion  25 mL/hr IntraVENous CONTINUOUS    arformoteroL (BROVANA) neb solution 15 mcg  15 mcg Nebulization BID RT    ampicillin-sulbactam (UNASYN) 3 g in 0.9% sodium chloride (MBP/ADV) 100 mL MBP  3 g IntraVENous Q6H    propofol (DIPRIVAN) 10 mg/mL infusion  0-50 mcg/kg/min IntraVENous TITRATE    [Held by provider] midazolam in normal saline (VERSED) 1 mg/mL infusion  0-5 mg/hr IntraVENous TITRATE    chlorhexidine (PERIDEX) 0.12 % mouthwash 10 mL  10 mL Oral Q12H    pantoprazole (PROTONIX) 40 mg in 0.9% sodium chloride 10 mL injection  40 mg IntraVENous Q24H    [Held by provider] 0.9% sodium chloride infusion  100 mL/hr IntraVENous CONTINUOUS    enoxaparin (LOVENOX) injection 40 mg  40 mg SubCUTAneous Q24H    fentaNYL (PF) 900 mcg/30 ml infusion soln  0-50 mcg/hr IntraVENous TITRATE    valproate (DEPACON) 1,000 mg in 0.9% sodium chloride 50 mL IVPB  1 g IntraVENous BID    lacosamide (VIMPAT) 200 mg in 0.9% sodium chloride 100 mL IVPB  200 mg IntraVENous BID         Objective:   Vital Signs:    Visit Vitals  BP (!) 142/82   Pulse 74   Temp 99.1 °F (37.3 °C)   Resp 15   Ht 5' 5.98\" (1.676 m)   Wt 81.6 kg (179 lb 14.3 oz)   SpO2 98%   BMI 29.05 kg/m²       O2 Device: Endotracheal tube,Ventilator       Temp (24hrs), Av.3 °F (37.4 °C), Min:98.4 °F (36.9 °C), Max:101 °F (38.3 °C)       Intake/Output:   Last shift:       07 -  1900  In: -   Out: 1000 [Urine:1000]    Last 3 shifts: 07/04 1901 -  0700  In: 5339.3 [I.V.:5309.3]  Out: 5440 [OGVVW:3947]      Intake/Output Summary (Last 24 hours) at 7/6/2022 1359  Last data filed at 7/6/2022 1151  Gross per 24 hour   Intake 2888.44 ml   Output 3600 ml   Net -711.56 ml       Last 3 Recorded Weights in this Encounter    07/04/22 0706 07/04/22 1923   Weight: 81.6 kg (179 lb 14.3 oz) 81.6 kg (179 lb 14.3 oz)         Ventilator Settings:  Mode Rate Tidal Volume Pressure FiO2 PEEP   Assist control,VC+   450 ml    25 % 5 cm H20     Peak airway pressure: 16 cm H2O    Plateau pressure:     Tidal volume:    Minute ventilation: 6.85 l/min     Recent Labs     07/06/22  0543 07/05/22  0427 07/04/22  1209   PHI 7.41 7.44 7.43   PCO2I 36.9 31.1* 32.6*   PO2I 137* 97 103*   HCO3I 23.6 21.0* 21.8*   FIO2I 30 30 50       Physical Exam:     General/Neurology: Intubated, on ventilator, sedated. Moving all 4 extremities spontaneously. Opens eyes   Head:   Normocephalic, without obvious abnormality, atraumatic. Eye:   Pupils bilateral equal and reactive to light. No scleral icterus, no pallor, no cyanosis. Nose:   No sinus tenderness  Throat:  Lips, mucosa, and tongue normal. No oral thrush. Neck:   Supple, symmetric. No lymphadenopathy. Trachea midline  Lung: Moderate air entry bilateral equal. No rales. At the bsaes decresed brath sounds No rhonchi. No wheezing. No stridors. No prolongded expiration. No accessory muscle use. Heart:   Regular rate & rhythm. S1 S2 present. No murmur. No JVD. Abdomen:  Soft. NT. ND. +BS. No masses. Extremities:  No pedal edema. No cyanosis. No clubbing. Pulses: 2+ and symmetric in DP. Capillary refill: normal  Lymphatic:  No cervical or supraclavicular palpable lymphadenopathy. Musculoskeletal: No joint swelling. No tenderness. Skin:   Color, texture, turgor normal. No rashes or lesions.        Data:       Recent Results (from the past 24 hour(s))   GLUCOSE, POC    Collection Time: 07/05/22  5:00 PM   Result Value Ref Range    Glucose (POC) 61 (L) 70 - 110 mg/dL   GLUCOSE, POC    Collection Time: 07/05/22  5:04 PM   Result Value Ref Range    Glucose (POC) 73 70 - 110 mg/dL   GLUCOSE, POC    Collection Time: 07/05/22 11:37 PM   Result Value Ref Range    Glucose (POC) 90 70 - 110 mg/dL   CALCIUM, IONIZED    Collection Time: 07/06/22  5:07 AM   Result Value Ref Range    Ionized Calcium 1.07 (L) 1.12 - 1.32 MMOL/L   CBC W/O DIFF    Collection Time: 07/06/22  5:07 AM   Result Value Ref Range    WBC 9.2 4.6 - 13.2 K/uL    RBC 4.39 4.35 - 5.65 M/uL    HGB 14.4 13.0 - 16.0 g/dL    HCT 41.4 36.0 - 48.0 %    MCV 94.3 78.0 - 100.0 FL    MCH 32.8 24.0 - 34.0 PG    MCHC 34.8 31.0 - 37.0 g/dL    RDW 11.9 11.6 - 14.5 %    PLATELET 220 641 - 100 K/uL    MPV 12.9 (H) 9.2 - 11.8 FL    NRBC 0.0 0  WBC    ABSOLUTE NRBC 0.00 0.00 - 6.60 K/uL   METABOLIC PANEL, COMPREHENSIVE    Collection Time: 07/06/22  5:07 AM   Result Value Ref Range    Sodium 143 136 - 145 mmol/L    Potassium 4.4 3.5 - 5.5 mmol/L    Chloride 113 (H) 100 - 111 mmol/L    CO2 24 21 - 32 mmol/L    Anion gap 6 3.0 - 18 mmol/L    Glucose 103 (H) 74 - 99 mg/dL    BUN 10 7.0 - 18 MG/DL    Creatinine 1.11 0.6 - 1.3 MG/DL    BUN/Creatinine ratio 9 (L) 12 - 20      GFR est AA >60 >60 ml/min/1.73m2    GFR est non-AA >60 >60 ml/min/1.73m2    Calcium 8.1 (L) 8.5 - 10.1 MG/DL    Bilirubin, total 0.5 0.2 - 1.0 MG/DL    ALT (SGPT) 22 16 - 61 U/L    AST (SGOT) 27 10 - 38 U/L    Alk.  phosphatase 70 45 - 117 U/L    Protein, total 6.2 (L) 6.4 - 8.2 g/dL    Albumin 3.1 (L) 3.4 - 5.0 g/dL    Globulin 3.1 2.0 - 4.0 g/dL    A-G Ratio 1.0 0.8 - 1.7     MAGNESIUM    Collection Time: 07/06/22  5:07 AM   Result Value Ref Range    Magnesium 2.2 1.6 - 2.6 mg/dL   BILIRUBIN, DIRECT    Collection Time: 07/06/22  5:07 AM   Result Value Ref Range    Bilirubin, direct 0.1 0.0 - 0.2 MG/DL   GLUCOSE, POC    Collection Time: 07/06/22  5:23 AM   Result Value Ref Range    Glucose (POC) 96 70 - 110 mg/dL   BLOOD GAS, ARTERIAL POC    Collection Time: 07/06/22  5:43 AM Result Value Ref Range    Device: ADULT VENT      FIO2 (POC) 30 %    pH (POC) 7.41 7.35 - 7.45      pCO2 (POC) 36.9 35.0 - 45.0 MMHG    pO2 (POC) 137 (H) 80 - 100 MMHG    HCO3 (POC) 23.6 22 - 26 MMOL/L    sO2 (POC) 99.1 (H) 92 - 97 %    Base deficit (POC) 0.6 mmol/L    Mode ASSIST CONTROL      Tidal volume 450 ml    Set Rate 15 bpm    PEEP/CPAP (POC) 5 cmH2O    Mean Airway Pressure 7.9 cmH2O    PIP (POC) 15      Allens test (POC) NOT APPLICABLE      Inspiratory Time 1 sec    Total resp. rate 15      Site LEFT RADIAL      Patient temp. 98.8      Specimen type (POC) ARTERIAL      Performed by Liliana PATTON POC    Collection Time: 07/06/22 11:41 AM   Result Value Ref Range    Glucose (POC) 87 70 - 110 mg/dL         Chemistry Recent Labs     07/06/22  0507 07/04/22  0630   * 205*    146*   K 4.4 4.0   * 113*   CO2 24 13*   BUN 10 19*   CREA 1.11 1.51*   CA 8.1* 9.8   MG 2.2  --    AGAP 6 20*   BUCR 9* 13   AP 70  --    TP 6.2*  --    ALB 3.1*  --    GLOB 3.1  --    AGRAT 1.0  --         Lactic Acid No results found for: LAC  No results for input(s): LAC in the last 72 hours. Liver Enzymes Protein, total   Date Value Ref Range Status   07/06/2022 6.2 (L) 6.4 - 8.2 g/dL Final     Albumin   Date Value Ref Range Status   07/06/2022 3.1 (L) 3.4 - 5.0 g/dL Final     Globulin   Date Value Ref Range Status   07/06/2022 3.1 2.0 - 4.0 g/dL Final     A-G Ratio   Date Value Ref Range Status   07/06/2022 1.0 0.8 - 1.7   Final     Alk.  phosphatase   Date Value Ref Range Status   07/06/2022 70 45 - 117 U/L Final     Recent Labs     07/06/22  0507   TP 6.2*   ALB 3.1*   GLOB 3.1   AGRAT 1.0   AP 70        CBC w/Diff Recent Labs     07/06/22  0507 07/04/22  0630   WBC 9.2 10.7   RBC 4.39 4.64   HGB 14.4 14.4   HCT 41.4 45.7    205   GRANS  --  61   LYMPH  --  29   EOS  --  0        Cardiac Enzymes No results found for: CPK, CK, CKMMB, CKMB, RCK3, CKMBT, CKNDX, CKND1, ANDRÉS, TROPT, TROIQ, ADAM, TROPT, TNIPOC, BNP, BNPP     BNP No results found for: BNP, BNPP, XBNPT     Coagulation Recent Labs     07/04/22 0630   PTP 14.3   INR 1.1   APTT 25.0         Thyroid  No results found for: T4, T3U, TSH, TSHEXT, TSHEXT    No results found for: T4     Urinalysis Lab Results   Component Value Date/Time    Color YELLOW 07/04/2022 07:50 AM    Appearance CLEAR 07/04/2022 07:50 AM    Specific gravity 1.030 07/04/2022 07:50 AM    pH (UA) 5.5 07/04/2022 07:50 AM    Protein 30 (A) 07/04/2022 07:50 AM    Glucose 500 (A) 07/04/2022 07:50 AM    Ketone Negative 07/04/2022 07:50 AM    Bilirubin Negative 07/04/2022 07:50 AM    Urobilinogen 0.2 07/04/2022 07:50 AM    Nitrites Negative 07/04/2022 07:50 AM    Leukocyte Esterase Negative 07/04/2022 07:50 AM    Epithelial cells FEW 07/04/2022 07:50 AM    Bacteria Negative 07/04/2022 07:50 AM    WBC Negative 07/04/2022 07:50 AM    RBC 0 to 3 07/04/2022 07:50 AM        Micro  No results for input(s): SDES, CULT in the last 72 hours. No results for input(s): CULT in the last 72 hours. Culture data during this hospitalization. All Micro Results     Procedure Component Value Units Date/Time    CULTURE, RESPIRATORY/SPUTUM/BRONCH Donzetta Krabbe [[id number]] Collected: 07/06/22 3906    Order Status: Completed Specimen: Sputum Updated: 07/06/22 0630             CT HEAD WO CONT    Result Date: 7/4/2022  EXAM: CT head CLINICAL INDICATION/HISTORY: Fall, seizure COMPARISON: None. TECHNIQUE: Axial CT imaging of the head was performed without intravenous contrast. One or more dose reduction techniques were used on this CT: automated exposure control, adjustment of the mAs and/or kVp according to patient size, and iterative reconstruction techniques. The specific techniques used on this CT exam have been documented in the patient's electronic medical record.  Digital Imaging and Communications in Medicine (DICOM) format image data are available to nonaffiliated external healthcare facilities or entities on a secured, media free, reciprocally searchable basis with patient authorization for at least a 12-month period after this study. _______________ FINDINGS: BRAIN AND EXTRA-AXIAL SPACES: Brain stimulator electrodes identified. No intracranial hemorrhage, midline shift, or uncal herniation. There are no areas of abnormal parenchymal attenuation. There are no abnormal extra-axial fluid collections. CALVARIUM: Postsurgical changes involving right frontoparietal calvarium. SINUSES: Clear. OTHER: None. _______________     1. No acute intracranial abnormalities. CT CHEST ABD PELV W CONT    Result Date: 7/4/2022  EXAM: CT of the chest, abdomen, and pelvis CLINICAL INDICATION/HISTORY: Seizure, chest pain, abdominal pain COMPARISON: None. TECHNIQUE: Axial CT imaging of the chest, abdomen, and pelvis was performed with intravenous contrast. Multiplanar reformats were generated. One or more dose reduction techniques were used on this CT: automated exposure control, adjustment of the mAs and/or kVp according to patient size, and iterative reconstruction techniques. The specific techniques used on this CT exam have been documented in the patient's electronic medical record. Digital Imaging and Communications in Medicine (DICOM) format image data are available to nonaffiliated external healthcare facilities or entities on a secured, media free, reciprocally searchable basis with patient authorization for at least a 12-month period after this study. _______________ FINDINGS: CHEST: LUNGS: Patchy atelectasis and/or infiltrate in the lower lobes. AIRWAY: Endotracheal tube is in place. Secretions identified in the airway. PLEURA: Normal, with no effusion or pneumothorax. MEDIASTINUM: Normal heart size. No pericardial effusion. Vasculature is unremarkable. LYMPH NODES: No enlarged lymph nodes. OTHER: None. =============== ABDOMEN/PELVIS: LIVER, BILIARY: Liver is unremarkable. No biliary dilation. Gallbladder is unremarkable. PANCREAS: Unremarkable. SPLEEN: Unremarkable. ADRENALS: Unremarkable. KIDNEYS/URETERS/BLADDER: Unremarkable. PELVIC ORGANS: Unremarkable. LYMPH NODES: No enlarged lymph nodes. GASTROINTESTINAL TRACT: No bowel dilation or wall thickening. VASCULATURE: Unremarkable. BONES: No acute or aggressive osseous abnormalities identified. OTHER: None. _______________     1. No traumatic injury identified. 2. Bilateral lower lobe opacities could relate to atelectasis, infiltrate, and/or aspiration. CT SPINE CERV WO CONT    Result Date: 7/4/2022  EXAM: CT cervical spine CLINICAL INDICATION/HISTORY: Trauma, neck pain. COMPARISON: None. TECHNIQUE: Axial CT imaging of the cervical spine was performed from the skull base to the thoracic inlet without intravenous contrast. Multiplanar reformats were generated. One or more dose reduction techniques were used on this CT: automated exposure control, adjustment of the mAs and/or kVp according to patient size, and iterative reconstruction techniques. The specific techniques used on this CT exam have been documented in the patient's electronic medical record. Digital Imaging and Communications in Medicine (DICOM) format image data are available to nonaffiliated external healthcare facilities or entities on a secured, media free, reciprocally searchable basis with patient authorization for at least a 12-month period after this study. _______________ FINDINGS: VERTEBRAE AND DISCS: Alignment and vertebral height is maintained with no fracture or traumatic subluxation identified. SPINAL CANAL AND FORAMINA: Patent without significant bony canal or foramina encroachment. PREVERTEBRAL SOFT TISSUES: No prevertebral soft tissue swelling. VISIBLE PORTIONS OF POSTERIOR FOSSA/BRAIN: Unremarkable. LUNG APICES: Clear. OTHER: None. _______________     1. No cervical spine fracture.      XR CHEST PORT    Result Date: 7/4/2022  EXAM: CHEST RADIOGRAPH CLINICAL INDICATION/HISTORY: pain   > Additional: Chest pain COMPARISON: None TECHNIQUE: Portable frontal view of the chest _______________ FINDINGS: SUPPORT DEVICES: Endotracheal tube is in good position. HEART AND MEDIASTINUM: Heart size is normal. LUNGS AND PLEURAL SPACES: Bilateral basilar atelectasis and or infiltrate. No pleural effusion or pneumothorax. BONES AND SOFT TISSUES: Stimulator generator projects over the left chest. _______________     1. Endotracheal tube is in good position. 2. Bilateral basilar atelectasis and/or infiltrate. Images report reviewed by me:  CT (Most Recent) (CT chest reviewed by me) Results from Hospital Encounter encounter on 07/04/22    CT CHEST ABD PELV W CONT    Narrative  EXAM: CT of the chest, abdomen, and pelvis    CLINICAL INDICATION/HISTORY: Seizure, chest pain, abdominal pain    COMPARISON: None. TECHNIQUE: Axial CT imaging of the chest, abdomen, and pelvis was performed with  intravenous contrast. Multiplanar reformats were generated. One or more dose  reduction techniques were used on this CT: automated exposure control,  adjustment of the mAs and/or kVp according to patient size, and iterative  reconstruction techniques. The specific techniques used on this CT exam have  been documented in the patient's electronic medical record. Digital Imaging and  Communications in Medicine (DICOM) format image data are available to  nonaffiliated external healthcare facilities or entities on a secured, media  free, reciprocally searchable basis with patient authorization for at least a  12-month period after this study. _______________    FINDINGS:    CHEST:    LUNGS: Patchy atelectasis and/or infiltrate in the lower lobes. AIRWAY: Endotracheal tube is in place. Secretions identified in the airway. PLEURA: Normal, with no effusion or pneumothorax. MEDIASTINUM: Normal heart size. No pericardial effusion. Vasculature is  unremarkable.     LYMPH NODES: No enlarged lymph nodes.    OTHER: None.    ===============    ABDOMEN/PELVIS:    LIVER, BILIARY: Liver is unremarkable. No biliary dilation. Gallbladder is  unremarkable. PANCREAS: Unremarkable. SPLEEN: Unremarkable. ADRENALS: Unremarkable. KIDNEYS/URETERS/BLADDER: Unremarkable. PELVIC ORGANS: Unremarkable. LYMPH NODES: No enlarged lymph nodes. GASTROINTESTINAL TRACT: No bowel dilation or wall thickening. VASCULATURE: Unremarkable. BONES: No acute or aggressive osseous abnormalities identified. OTHER: None.    _______________    Impression  1. No traumatic injury identified. 2. Bilateral lower lobe opacities could relate to atelectasis, infiltrate,  and/or aspiration. CXR reviewed by me:  XR (Most Recent). CXR  reviewed by me and compared with previous CXR Results from Hospital Encounter encounter on 07/04/22    XR ABD (KUB)    Narrative  EXAM: Frontal view of the abdomen    CLINICAL INDICATION/HISTORY: OG tube placement    COMPARISON: None.    _______________    FINDINGS:    NG/OG tube is in place with the tip in the right abdomen in the region of the  gastric antrum. No bowel obstruction identified. _______________    Impression  1. NG/OG tube is in good position. ·Please note: Voice-recognition software may have been used to generate this report, which may have resulted in some phonetic-based errors in grammar and contents. Even though attempts were made to correct all the mistakes, some may have been missed, and remained in the body of the document.       Siria Byrd MD  7/6/2022

## 2022-07-07 ENCOUNTER — APPOINTMENT (OUTPATIENT)
Dept: GENERAL RADIOLOGY | Age: 27
DRG: 053 | End: 2022-07-07
Attending: INTERNAL MEDICINE
Payer: MEDICAID

## 2022-07-07 ENCOUNTER — APPOINTMENT (OUTPATIENT)
Dept: VASCULAR SURGERY | Age: 27
DRG: 053 | End: 2022-07-07
Attending: FAMILY MEDICINE
Payer: MEDICAID

## 2022-07-07 LAB
ALBUMIN SERPL-MCNC: 2.5 G/DL (ref 3.4–5)
ALBUMIN/GLOB SERPL: 0.7 {RATIO} (ref 0.8–1.7)
ALP SERPL-CCNC: 59 U/L (ref 45–117)
ALT SERPL-CCNC: 14 U/L (ref 16–61)
ANION GAP SERPL CALC-SCNC: 5 MMOL/L (ref 3–18)
ARTERIAL PATENCY WRIST A: ABNORMAL
AST SERPL-CCNC: 21 U/L (ref 10–38)
BASE DEFICIT BLD-SCNC: 1.2 MMOL/L
BDY SITE: ABNORMAL
BILIRUB SERPL-MCNC: 0.3 MG/DL (ref 0.2–1)
BODY TEMPERATURE: 98.5
BUN SERPL-MCNC: 12 MG/DL (ref 7–18)
BUN/CREAT SERPL: 11 (ref 12–20)
CA-I SERPL-SCNC: 1.13 MMOL/L (ref 1.12–1.32)
CALCIUM SERPL-MCNC: 8.3 MG/DL (ref 8.5–10.1)
CHLORIDE SERPL-SCNC: 114 MMOL/L (ref 100–111)
CO2 SERPL-SCNC: 24 MMOL/L (ref 21–32)
CREAT SERPL-MCNC: 1.05 MG/DL (ref 0.6–1.3)
ERYTHROCYTE [DISTWIDTH] IN BLOOD BY AUTOMATED COUNT: 11.7 % (ref 11.6–14.5)
GAS FLOW.O2 O2 DELIVERY SYS: ABNORMAL L/MIN
GAS FLOW.O2 SETTING OXYMISER: 151 BPM
GLOBULIN SER CALC-MCNC: 3.4 G/DL (ref 2–4)
GLUCOSE BLD STRIP.AUTO-MCNC: 106 MG/DL (ref 70–110)
GLUCOSE BLD STRIP.AUTO-MCNC: 89 MG/DL (ref 70–110)
GLUCOSE BLD STRIP.AUTO-MCNC: 89 MG/DL (ref 70–110)
GLUCOSE BLD STRIP.AUTO-MCNC: 92 MG/DL (ref 70–110)
GLUCOSE SERPL-MCNC: 87 MG/DL (ref 74–99)
HCO3 BLD-SCNC: 23 MMOL/L (ref 22–26)
HCT VFR BLD AUTO: 42.9 % (ref 36–48)
HGB BLD-MCNC: 14.5 G/DL (ref 13–16)
INSPIRATION.DURATION SETTING TIME VENT: 1 SEC
MAGNESIUM SERPL-MCNC: 2.1 MG/DL (ref 1.6–2.6)
MCH RBC QN AUTO: 31 PG (ref 24–34)
MCHC RBC AUTO-ENTMCNC: 33.8 G/DL (ref 31–37)
MCV RBC AUTO: 91.7 FL (ref 78–100)
NRBC # BLD: 0 K/UL (ref 0–0.01)
NRBC BLD-RTO: 0 PER 100 WBC
O2/TOTAL GAS SETTING VFR VENT: 25 %
PAW @ MEAN EXP FLOW ON VENT: 8.1 CMH2O
PCO2 BLD: 35.9 MMHG (ref 35–45)
PEEP RESPIRATORY: 5 CMH2O
PH BLD: 7.42 [PH] (ref 7.35–7.45)
PHOSPHATE SERPL-MCNC: 4.7 MG/DL (ref 2.5–4.9)
PIP ISTAT,IPIP: 16
PLATELET # BLD AUTO: 170 K/UL (ref 135–420)
PMV BLD AUTO: 11.4 FL (ref 9.2–11.8)
PO2 BLD: 105 MMHG (ref 80–100)
POTASSIUM SERPL-SCNC: 3.9 MMOL/L (ref 3.5–5.5)
POTASSIUM SERPL-SCNC: 4.6 MMOL/L (ref 3.5–5.5)
PROT SERPL-MCNC: 5.9 G/DL (ref 6.4–8.2)
RBC # BLD AUTO: 4.68 M/UL (ref 4.35–5.65)
SAO2 % BLD: 98.2 % (ref 92–97)
SERVICE CMNT-IMP: ABNORMAL
SODIUM SERPL-SCNC: 143 MMOL/L (ref 136–145)
SPECIMEN TYPE: ABNORMAL
TOTAL RESP. RATE, ITRR: 15
VENTILATION MODE VENT: ABNORMAL
VT SETTING VENT: 450 ML
WBC # BLD AUTO: 6.8 K/UL (ref 4.6–13.2)

## 2022-07-07 PROCEDURE — 82803 BLOOD GASES ANY COMBINATION: CPT

## 2022-07-07 PROCEDURE — 74011250637 HC RX REV CODE- 250/637: Performed by: FAMILY MEDICINE

## 2022-07-07 PROCEDURE — 36600 WITHDRAWAL OF ARTERIAL BLOOD: CPT

## 2022-07-07 PROCEDURE — 94640 AIRWAY INHALATION TREATMENT: CPT

## 2022-07-07 PROCEDURE — 77010033678 HC OXYGEN DAILY

## 2022-07-07 PROCEDURE — 84100 ASSAY OF PHOSPHORUS: CPT

## 2022-07-07 PROCEDURE — 65610000006 HC RM INTENSIVE CARE

## 2022-07-07 PROCEDURE — 74011250636 HC RX REV CODE- 250/636: Performed by: INTERNAL MEDICINE

## 2022-07-07 PROCEDURE — 93971 EXTREMITY STUDY: CPT

## 2022-07-07 PROCEDURE — 82330 ASSAY OF CALCIUM: CPT

## 2022-07-07 PROCEDURE — 84132 ASSAY OF SERUM POTASSIUM: CPT

## 2022-07-07 PROCEDURE — 85027 COMPLETE CBC AUTOMATED: CPT

## 2022-07-07 PROCEDURE — 74011250636 HC RX REV CODE- 250/636: Performed by: PSYCHIATRY & NEUROLOGY

## 2022-07-07 PROCEDURE — 83735 ASSAY OF MAGNESIUM: CPT

## 2022-07-07 PROCEDURE — 82962 GLUCOSE BLOOD TEST: CPT

## 2022-07-07 PROCEDURE — 74011000258 HC RX REV CODE- 258: Performed by: PSYCHIATRY & NEUROLOGY

## 2022-07-07 PROCEDURE — 74011250636 HC RX REV CODE- 250/636: Performed by: EMERGENCY MEDICINE

## 2022-07-07 PROCEDURE — C9254 INJECTION, LACOSAMIDE: HCPCS | Performed by: PSYCHIATRY & NEUROLOGY

## 2022-07-07 PROCEDURE — 74011000250 HC RX REV CODE- 250: Performed by: INTERNAL MEDICINE

## 2022-07-07 PROCEDURE — 71045 X-RAY EXAM CHEST 1 VIEW: CPT

## 2022-07-07 PROCEDURE — 77030013141 HC MSK NEB VYRM -B

## 2022-07-07 PROCEDURE — 36415 COLL VENOUS BLD VENIPUNCTURE: CPT

## 2022-07-07 PROCEDURE — C9113 INJ PANTOPRAZOLE SODIUM, VIA: HCPCS | Performed by: INTERNAL MEDICINE

## 2022-07-07 PROCEDURE — 74011000258 HC RX REV CODE- 258: Performed by: INTERNAL MEDICINE

## 2022-07-07 PROCEDURE — 74011250637 HC RX REV CODE- 250/637: Performed by: PSYCHIATRY & NEUROLOGY

## 2022-07-07 PROCEDURE — 94003 VENT MGMT INPAT SUBQ DAY: CPT

## 2022-07-07 RX ORDER — ZONISAMIDE 100 MG/1
300 CAPSULE ORAL DAILY
Status: DISCONTINUED | OUTPATIENT
Start: 2022-07-07 | End: 2022-07-07

## 2022-07-07 RX ORDER — VALPROIC ACID 250 MG/5ML
1000 SOLUTION ORAL EVERY 12 HOURS
Status: DISCONTINUED | OUTPATIENT
Start: 2022-07-07 | End: 2022-07-09 | Stop reason: HOSPADM

## 2022-07-07 RX ORDER — AMOXICILLIN AND CLAVULANATE POTASSIUM 875; 125 MG/1; MG/1
1 TABLET, FILM COATED ORAL EVERY 12 HOURS
Status: DISCONTINUED | OUTPATIENT
Start: 2022-07-07 | End: 2022-07-09 | Stop reason: HOSPADM

## 2022-07-07 RX ORDER — VALPROIC ACID 250 MG/5ML
1000 SOLUTION ORAL EVERY 12 HOURS
Status: DISCONTINUED | OUTPATIENT
Start: 2022-07-08 | End: 2022-07-07

## 2022-07-07 RX ORDER — POTASSIUM CHLORIDE 7.45 MG/ML
10 INJECTION INTRAVENOUS ONCE
Status: COMPLETED | OUTPATIENT
Start: 2022-07-07 | End: 2022-07-07

## 2022-07-07 RX ORDER — ZONISAMIDE 100 MG/1
300 CAPSULE ORAL
Status: DISCONTINUED | OUTPATIENT
Start: 2022-07-07 | End: 2022-07-09 | Stop reason: HOSPADM

## 2022-07-07 RX ORDER — PHENYTOIN SODIUM 50 MG/ML
100 INJECTION, SOLUTION INTRAMUSCULAR; INTRAVENOUS DAILY
Status: DISCONTINUED | OUTPATIENT
Start: 2022-07-08 | End: 2022-07-08

## 2022-07-07 RX ORDER — BUDESONIDE 0.5 MG/2ML
500 INHALANT ORAL
Status: DISCONTINUED | OUTPATIENT
Start: 2022-07-07 | End: 2022-07-09 | Stop reason: HOSPADM

## 2022-07-07 RX ORDER — CLONAZEPAM 0.5 MG/1
0.5 TABLET, ORALLY DISINTEGRATING ORAL
Status: DISCONTINUED | OUTPATIENT
Start: 2022-07-08 | End: 2022-07-09 | Stop reason: HOSPADM

## 2022-07-07 RX ADMIN — ENOXAPARIN SODIUM 40 MG: 100 INJECTION SUBCUTANEOUS at 12:59

## 2022-07-07 RX ADMIN — 0.12% CHLORHEXIDINE GLUCONATE 10 ML: 1.2 RINSE ORAL at 08:31

## 2022-07-07 RX ADMIN — SODIUM CHLORIDE 40 MG: 9 INJECTION, SOLUTION INTRAMUSCULAR; INTRAVENOUS; SUBCUTANEOUS at 12:59

## 2022-07-07 RX ADMIN — DEXTROSE MONOHYDRATE AND SODIUM CHLORIDE 25 ML/HR: 5; .45 INJECTION, SOLUTION INTRAVENOUS at 19:46

## 2022-07-07 RX ADMIN — POTASSIUM CHLORIDE 10 MEQ: 7.46 INJECTION, SOLUTION INTRAVENOUS at 08:29

## 2022-07-07 RX ADMIN — PHENYTOIN SODIUM 100 MG: 50 INJECTION INTRAMUSCULAR; INTRAVENOUS at 08:30

## 2022-07-07 RX ADMIN — BUDESONIDE 500 MCG: 0.5 INHALANT RESPIRATORY (INHALATION) at 10:08

## 2022-07-07 RX ADMIN — SODIUM CHLORIDE 200 MG: 9 INJECTION, SOLUTION INTRAVENOUS at 11:13

## 2022-07-07 RX ADMIN — BUDESONIDE 500 MCG: 0.5 INHALANT RESPIRATORY (INHALATION) at 21:08

## 2022-07-07 RX ADMIN — SODIUM CHLORIDE 200 MG: 9 INJECTION, SOLUTION INTRAVENOUS at 22:26

## 2022-07-07 RX ADMIN — ARFORMOTEROL TARTRATE 15 MCG: 15 SOLUTION RESPIRATORY (INHALATION) at 07:18

## 2022-07-07 RX ADMIN — PROPOFOL 35 MCG/KG/MIN: 10 INJECTION, EMULSION INTRAVENOUS at 04:54

## 2022-07-07 RX ADMIN — ZONISAMIDE 300 MG: 100 CAPSULE ORAL at 22:52

## 2022-07-07 RX ADMIN — ARFORMOTEROL TARTRATE 15 MCG: 15 SOLUTION RESPIRATORY (INHALATION) at 21:08

## 2022-07-07 RX ADMIN — SODIUM CHLORIDE 3 G: 900 INJECTION INTRAVENOUS at 05:00

## 2022-07-07 RX ADMIN — VALPROIC ACID 1000 MG: 250 SOLUTION ORAL at 08:31

## 2022-07-07 RX ADMIN — VALPROIC ACID 1000 MG: 250 SOLUTION ORAL at 22:52

## 2022-07-07 NOTE — PROGRESS NOTES
Hospitalist Progress Note    Patient: Sajan Peter MRN: [id number]  CSN: [id number]    YOB: 1995  Age: 33 y.o. Sex: male    DOA: 7/4/2022 LOS:  LOS: 3 days                Assessment/Plan     Patient Active Problem List   Diagnosis Code    [facility] G40.901    Respiratory failure ([facility] J96.90    Marijuana use F12.90    Hypernatremia E87.0        Chief complaint :  Seizures  33 y.o. male with seizure disorder, status post vagus nerve stimulator placement, asthma is brought to ER by EMS as he was found unresponsive by paramedics. Admitted for status epilepticus. Awake, Alert, following commands.     Resp -   Respiratory failure -  Extubated 07/07/2022.      ID -   Concern for aspiration in ER  Received clindamycin  On augmentin.     [facility] - Monitor HD.       Heme/onc - Follow H&H, plts.      Renal - Trend BUN, Cr, follow I/O, hay in place. Check and replace Mg, K, phos.     Endocrine -  Follow FSG     Neuro/ Pain/ Sedation -   Status epilepticus -  Neurology following  Had seizure again after admission  On vimpat, dilantin, valproic acid. Restart zonegran and klonopin nightly.     Sedation - propofol, fentanyl.     His urine drug screen positive for THC, benzodiazepine.     GI - SLP eval, diet per SLP.    Prophylaxis - DVT:lovenox, GI: protonix         Disposition : TBD    Review of systems  General: No fevers or chills. Cardiovascular: No chest pain or pressure. No palpitations. Pulmonary: No shortness of breath. Gastrointestinal: No nausea, vomiting. Physical Exam:  General: Awake, cooperative, no acute distress    HEENT: NC, Atraumatic. PERRLA, anicteric sclerae. Lungs: CTA Bilaterally. No Wheezing/Rhonchi/Rales. Heart:  S1 S2,  No murmur, No Rubs, No Gallops  Abdomen: Soft, Non distended, Non tender.  +Bowel sounds,   Extremities: No c/c/e  Psych:   Not anxious or agitated. Neurologic:  No acute neurological deficit.            Vital signs/Intake and Output:  Visit Vitals  BP (!) 141/79   Pulse 75   Temp 98.5 °F (36.9 °C)   Resp 15   Ht 5' 5.98\" (1.676 m)   Wt 81.6 kg (179 lb 14.3 oz)   SpO2 100%   BMI 29.05 kg/m²     Current Shift:  No intake/output data recorded. Last three shifts:  07/05 1901 - 07/07 0700  In: 4049.2 [I.V.:4049.2]  Out: 7825 [Urine:3675]            Labs: Results:       Chemistry Recent Labs     07/07/22  0420 07/06/22  0507   GLU 87 103*    143   K 3.9 4.4   * 113*   CO2 24 24   BUN 12 10   CREA 1.05 1.11   CA 8.3* 8.1*   AGAP 5 6   BUCR 11* 9*   AP 59 70   TP 5.9* 6.2*   ALB 2.5* 3.1*   GLOB 3.4 3.1   AGRAT 0.7* 1.0      CBC w/Diff Recent Labs     07/07/22  0805 07/06/22  0507   WBC 6.8 9.2   RBC 4.68 4.39   HGB 14.5 14.4   HCT 42.9 41.4    149      Cardiac Enzymes No results for input(s): CPK, CKND1, ANDRÉS in the last 72 hours. No lab exists for component: CKRMB, TROIP   Coagulation No results for input(s): PTP, INR, APTT, INREXT, INREXT in the last 72 hours. Lipid Panel No results found for: CHOL, CHOLPOCT, CHOLX, CHLST, CHOLV, 701273, HDL, HDLP, LDL, LDLC, DLDLP, 757387, VLDLC, VLDL, TGLX, TRIGL, TRIGP, TGLPOCT, CHHD, CHHDX   BNP No results for input(s): BNPP in the last 72 hours.    Liver Enzymes Recent Labs     07/07/22  0420   TP 5.9*   ALB 2.5*   AP 59      Thyroid Studies No results found for: T4, T3U, TSH, TSHEXT, TSHEXT     Procedures/imaging: see electronic medical records for all procedures/Xrays and details which were not copied into this note but were reviewed prior to creation of Plan

## 2022-07-07 NOTE — PROGRESS NOTES
NEUROLOGY PROGRESS NOTE        Patient: Mariah Hernadez        Sex: male          DOA: 7/4/2022  YOB: 1995      Age:  33 y.o.         LOS: 3 days     Identification:  [id number] male with past medical history of refractory seizure presents with multiple seizures. SUBJECTIVE:   No recurrent seizure overnight. Extubation will be tried today. REVIEW OF SYSTEMS: UTO    OBJECTIVE:      Visit Vitals  BP (!) 141/79   Pulse 75   Temp 98.5 °F (36.9 °C)   Resp 15   Ht 5' 5.98\" (1.676 m)   Wt 81.6 kg (179 lb 14.3 oz)   SpO2 100%   BMI 29.05 kg/m²     Physical Exam:  GEN: Alert, NAD  EYES: conjunctiva normal, lids with out lesions  HEENT: MMM. HEART: RRR +S1 +S2  LUNGS: CTA B/L no rales or rhonchi. ABDOMEN: Soft, non-tender. EXTREMITIES: No edema cyanosis  SKIN: no rashes or skin breakdown, no nodules  NEURO: Intubated and sedated. Arousable to voice. PERRLA, spontaneous movement of all 4 extremities. positive pain withdrawal all 4 extremeties. DTR absent throughout. Bilateral toes are mute.     Current Facility-Administered Medications   Medication Dose Route Frequency    budesonide (PULMICORT) 500 mcg/2 ml nebulizer suspension  500 mcg Nebulization BID RT    amoxicillin-clavulanate (AUGMENTIN) 875-125 mg per tablet 1 Tablet  1 Tablet Oral Q12H    phenytoin (DILANTIN) injection 100 mg  100 mg IntraVENous BID    valproic acid (as sodium salt) (DEPAKENE) 250 mg/5 mL (5 mL) oral solution 1,000 mg  1,000 mg Per NG tube Q12H    insulin lispro (HUMALOG) injection   SubCUTAneous Q6H    glucose chewable tablet 16 g  4 Tablet Oral PRN    glucagon (GLUCAGEN) injection 1 mg  1 mg IntraMUSCular PRN    dextrose 10% infusion 0-250 mL  0-250 mL IntraVENous PRN    dextrose 5 % - 0.45% NaCl infusion  25 mL/hr IntraVENous CONTINUOUS    arformoteroL (BROVANA) neb solution 15 mcg  15 mcg Nebulization BID RT    ELECTROLYTE REPLACEMENT PROTOCOL - Potassium Standard Dosing   1 Each Other PRN    ELECTROLYTE REPLACEMENT PROTOCOL - Magnesium   1 Each Other PRN    ELECTROLYTE REPLACEMENT PROTOCOL - Phosphorus  Standard Dosing  1 Each Other PRN    ELECTROLYTE REPLACEMENT PROTOCOL - Calcium   1 Each Other PRN    acetaminophen (TYLENOL) solution 650 mg  650 mg Per G Tube Q6H PRN    [Held by provider] propofol (DIPRIVAN) 10 mg/mL infusion  0-50 mcg/kg/min IntraVENous TITRATE    [Held by provider] midazolam in normal saline (VERSED) 1 mg/mL infusion  0-5 mg/hr IntraVENous TITRATE    midazolam (VERSED) injection 1 mg  1 mg IntraVENous Q2H PRN    fentaNYL citrate (PF) injection 25 mcg  25 mcg IntraVENous Q4H PRN    chlorhexidine (PERIDEX) 0.12 % mouthwash 10 mL  10 mL Oral Q12H    pantoprazole (PROTONIX) 40 mg in 0.9% sodium chloride 10 mL injection  40 mg IntraVENous Q24H    [Held by provider] 0.9% sodium chloride infusion  100 mL/hr IntraVENous CONTINUOUS    enoxaparin (LOVENOX) injection 40 mg  40 mg SubCUTAneous Q24H    albuterol CONCENTRATE 2.5mg/0.5 mL neb soln  2.5 mg Nebulization Q4H PRN    [Held by provider] fentaNYL (PF) 900 mcg/30 ml infusion soln  0-50 mcg/hr IntraVENous TITRATE    lacosamide (VIMPAT) 200 mg in 0.9% sodium chloride 100 mL IVPB  200 mg IntraVENous BID    midazolam (VERSED) injection 4 mg  4 mg IntraVENous Q4H PRN       Laboratory  Recent Results (from the past 24 hour(s))   GLUCOSE, POC    Collection Time: 07/06/22 11:41 AM   Result Value Ref Range    Glucose (POC) 87 70 - 110 mg/dL   CALCIUM, IONIZED    Collection Time: 07/06/22  1:35 PM   Result Value Ref Range    Ionized Calcium 1.23 1.12 - 1.32 MMOL/L   GLUCOSE, POC    Collection Time: 07/06/22  5:14 PM   Result Value Ref Range    Glucose (POC) 88 70 - 110 mg/dL   GLUCOSE, POC    Collection Time: 07/06/22 11:36 PM   Result Value Ref Range    Glucose (POC) 86 70 - 169 mg/dL   METABOLIC PANEL, COMPREHENSIVE    Collection Time: 07/07/22  4:20 AM   Result Value Ref Range    Sodium 143 136 - 145 mmol/L    Potassium 3.9 3.5 - 5.5 mmol/L    Chloride 114 (H) 100 - 111 mmol/L    CO2 24 21 - 32 mmol/L    Anion gap 5 3.0 - 18 mmol/L    Glucose 87 74 - 99 mg/dL    BUN 12 7.0 - 18 MG/DL    Creatinine 1.05 0.6 - 1.3 MG/DL    BUN/Creatinine ratio 11 (L) 12 - 20      GFR est AA >60 >60 ml/min/1.73m2    GFR est non-AA >60 >60 ml/min/1.73m2    Calcium 8.3 (L) 8.5 - 10.1 MG/DL    Bilirubin, total 0.3 0.2 - 1.0 MG/DL    ALT (SGPT) 14 (L) 16 - 61 U/L    AST (SGOT) 21 10 - 38 U/L    Alk. phosphatase 59 45 - 117 U/L    Protein, total 5.9 (L) 6.4 - 8.2 g/dL    Albumin 2.5 (L) 3.4 - 5.0 g/dL    Globulin 3.4 2.0 - 4.0 g/dL    A-G Ratio 0.7 (L) 0.8 - 1.7     PHOSPHORUS    Collection Time: 07/07/22  4:20 AM   Result Value Ref Range    Phosphorus 4.7 2.5 - 4.9 MG/DL   CALCIUM, IONIZED    Collection Time: 07/07/22  4:20 AM   Result Value Ref Range    Ionized Calcium 1.13 1.12 - 1.32 MMOL/L   MAGNESIUM    Collection Time: 07/07/22  4:20 AM   Result Value Ref Range    Magnesium 2.1 1.6 - 2.6 mg/dL   GLUCOSE, POC    Collection Time: 07/07/22  5:25 AM   Result Value Ref Range    Glucose (POC) 89 70 - 110 mg/dL   BLOOD GAS, ARTERIAL POC    Collection Time: 07/07/22  5:28 AM   Result Value Ref Range    Device: ADULT VENT      FIO2 (POC) 25 %    pH (POC) 7.42 7.35 - 7.45      pCO2 (POC) 35.9 35.0 - 45.0 MMHG    pO2 (POC) 105 (H) 80 - 100 MMHG    HCO3 (POC) 23.0 22 - 26 MMOL/L    sO2 (POC) 98.2 (H) 92 - 97 %    Base deficit (POC) 1.2 mmol/L    Mode ASSIST CONTROL      Tidal volume 450 ml    Set Rate 151 bpm    PEEP/CPAP (POC) 5 cmH2O    Mean Airway Pressure 8.1 cmH2O    PIP (POC) 16      Allens test (POC) NOT APPLICABLE      Inspiratory Time 1 sec    Total resp. rate 15      Site RIGHT RADIAL      Patient temp.  98.5      Specimen type (POC) ARTERIAL      Performed by Helen Johnson    CBC W/O DIFF    Collection Time: 07/07/22  8:05 AM   Result Value Ref Range    WBC 6.8 4.6 - 13.2 K/uL    RBC 4.68 4.35 - 5.65 M/uL    HGB 14.5 13.0 - 16.0 g/dL    HCT 42.9 36.0 - 48.0 %    MCV 91.7 78.0 - 100.0 FL    MCH 31.0 24.0 - 34.0 PG    MCHC 33.8 31.0 - 37.0 g/dL    RDW 11.7 11.6 - 14.5 %    PLATELET 344 207 - 239 K/uL    MPV 11.4 9.2 - 11.8 FL    NRBC 0.0 0  WBC    ABSOLUTE NRBC 0.00 0.00 - 0.01 K/uL       Radiology:  XR WRIST LT AP/LAT/OBL MIN 3V    Result Date: 7/4/2022  EXAM: 3 views left wrist CLINICAL INDICATION/HISTORY: Left wrist pain COMPARISON: None. _______________ FINDINGS: No fracture or dislocation. Joint spaces are maintained. _______________     1. No fracture or dislocation. XR ABD (KUB)    Result Date: 7/4/2022  EXAM: Frontal view of the abdomen CLINICAL INDICATION/HISTORY: OG tube placement COMPARISON: None. _______________ FINDINGS: NG/OG tube is in place with the tip in the right abdomen in the region of the gastric antrum. No bowel obstruction identified. _______________     1. NG/OG tube is in good position. CT HEAD WO CONT    Result Date: 7/4/2022  EXAM: CT head CLINICAL INDICATION/HISTORY: Fall, seizure COMPARISON: None. TECHNIQUE: Axial CT imaging of the head was performed without intravenous contrast. One or more dose reduction techniques were used on this CT: automated exposure control, adjustment of the mAs and/or kVp according to patient size, and iterative reconstruction techniques. The specific techniques used on this CT exam have been documented in the patient's electronic medical record. Digital Imaging and Communications in Medicine (DICOM) format image data are available to nonaffiliated external healthcare facilities or entities on a secured, media free, reciprocally searchable basis with patient authorization for at least a 12-month period after this study. _______________ FINDINGS: BRAIN AND EXTRA-AXIAL SPACES: Brain stimulator electrodes identified. No intracranial hemorrhage, midline shift, or uncal herniation. There are no areas of abnormal parenchymal attenuation. There are no abnormal extra-axial fluid collections.  CALVARIUM: Postsurgical changes involving right frontoparietal calvarium. SINUSES: Clear. OTHER: None. _______________     1. No acute intracranial abnormalities. CT CHEST ABD PELV W CONT    Result Date: 7/4/2022  EXAM: CT of the chest, abdomen, and pelvis CLINICAL INDICATION/HISTORY: Seizure, chest pain, abdominal pain COMPARISON: None. TECHNIQUE: Axial CT imaging of the chest, abdomen, and pelvis was performed with intravenous contrast. Multiplanar reformats were generated. One or more dose reduction techniques were used on this CT: automated exposure control, adjustment of the mAs and/or kVp according to patient size, and iterative reconstruction techniques. The specific techniques used on this CT exam have been documented in the patient's electronic medical record. Digital Imaging and Communications in Medicine (DICOM) format image data are available to nonaffiliated external healthcare facilities or entities on a secured, media free, reciprocally searchable basis with patient authorization for at least a 12-month period after this study. _______________ FINDINGS: CHEST: LUNGS: Patchy atelectasis and/or infiltrate in the lower lobes. AIRWAY: Endotracheal tube is in place. Secretions identified in the airway. PLEURA: Normal, with no effusion or pneumothorax. MEDIASTINUM: Normal heart size. No pericardial effusion. Vasculature is unremarkable. LYMPH NODES: No enlarged lymph nodes. OTHER: None. =============== ABDOMEN/PELVIS: LIVER, BILIARY: Liver is unremarkable. No biliary dilation. Gallbladder is unremarkable. PANCREAS: Unremarkable. SPLEEN: Unremarkable. ADRENALS: Unremarkable. KIDNEYS/URETERS/BLADDER: Unremarkable. PELVIC ORGANS: Unremarkable. LYMPH NODES: No enlarged lymph nodes. GASTROINTESTINAL TRACT: No bowel dilation or wall thickening. VASCULATURE: Unremarkable. BONES: No acute or aggressive osseous abnormalities identified. OTHER: None. _______________     1. No traumatic injury identified.  2. Bilateral lower lobe opacities could relate to atelectasis, infiltrate, and/or aspiration. CT SPINE CERV WO CONT    Result Date: 7/4/2022  EXAM: CT cervical spine CLINICAL INDICATION/HISTORY: Trauma, neck pain. COMPARISON: None. TECHNIQUE: Axial CT imaging of the cervical spine was performed from the skull base to the thoracic inlet without intravenous contrast. Multiplanar reformats were generated. One or more dose reduction techniques were used on this CT: automated exposure control, adjustment of the mAs and/or kVp according to patient size, and iterative reconstruction techniques. The specific techniques used on this CT exam have been documented in the patient's electronic medical record. Digital Imaging and Communications in Medicine (DICOM) format image data are available to nonaffiliated external healthcare facilities or entities on a secured, media free, reciprocally searchable basis with patient authorization for at least a 12-month period after this study. _______________ FINDINGS: VERTEBRAE AND DISCS: Alignment and vertebral height is maintained with no fracture or traumatic subluxation identified. SPINAL CANAL AND FORAMINA: Patent without significant bony canal or foramina encroachment. PREVERTEBRAL SOFT TISSUES: No prevertebral soft tissue swelling. VISIBLE PORTIONS OF POSTERIOR FOSSA/BRAIN: Unremarkable. LUNG APICES: Clear. OTHER: None. _______________     1. No cervical spine fracture. XR CHEST PORT    Result Date: 7/5/2022  EXAM: XR CHEST PORT CLINICAL INDICATION/HISTORY: On ventilator -Additional: None COMPARISON: One day prior TECHNIQUE: Portable frontal view of the chest _______________ FINDINGS: SUPPORT DEVICES: Endotracheal and enteric tubes unchanged. HEART AND MEDIASTINUM: Cardiomediastinal silhouette within normal limits. LUNGS AND PLEURAL SPACES: No dense consolidation, large effusion or pneumothorax. _______________     No acute cardiopulmonary abnormality.     XR CHEST PORT    Result Date: 7/4/2022  EXAM: CHEST RADIOGRAPH CLINICAL INDICATION/HISTORY: pain   > Additional: Chest pain COMPARISON: None TECHNIQUE: Portable frontal view of the chest _______________ FINDINGS: SUPPORT DEVICES: Endotracheal tube is in good position. HEART AND MEDIASTINUM: Heart size is normal. LUNGS AND PLEURAL SPACES: Bilateral basilar atelectasis and or infiltrate. No pleural effusion or pneumothorax. BONES AND SOFT TISSUES: Stimulator generator projects over the left chest. _______________     1. Endotracheal tube is in good position. 2. Bilateral basilar atelectasis and/or infiltrate. ASSESSMENT/IMPRESSION:   15-year-old male with longstanding refractory seizures status post VNS and bilateral temporal RNS placement presents with seizure and postictal confusion. He is intubated for airway protection. Head CT is normal.  1. Epilepsy:  Status epilepticus has resolved.     RECOMMENDATIONS:  1. Continue Depakote 1000 mg twice daily and Vimpat 200 mg twice daily. Decrease Dilantin to 100 mg daily. Restart oral Zonegran 300 mg nightly and Klonopin 0.5 mg nightly after extubation.         I will follow the patient. Please do not hesitate to return with any questions. Signed:   Masha Dean MD  7/7/2022  10:19 AM

## 2022-07-07 NOTE — PROGRESS NOTES
[facility]  Pulmonary, Critical Care, and Sleep Medicine    Name: Barbara Jordan MRN: [id number]   : 1995 Hospital: [facility]    Date: 2022  Room: [address] [place] [address] Note                                              Consult requesting physician: Dr. Seble Hubbard  Reason for Consult: Ventilator management, status epilepticus    IMPRESSION:       DONOVAN/Gerardo Mohr 1106 Problems    Diagnosis Date Noted    Status epilepticus ([facility] [address] 2022    Respiratory failure ([address] 2022    Marijuana use 2022    Hypernatremia 2022   ·      Patient Active Problem List   Diagnosis Code    Status epilepticus ([facility] [address] G40.901    Respiratory failure ([facility] [address] J96.90    Marijuana use F12.90    Hypernatremia E87.0         · Code status: Full code      RECOMMENDATIONS:     Respiratory: Intubated for airway protection due to multiple seizures, status epilepticus. SBT today   Awake off sedation   Wean to extubate  CXR atelectasis   History of asthma and smoker. Bronchodilators:brovana bid   pulmicort bid   7.42/35/105/98  I spoke to neurology no more seizures  CXR: ETT in place. Bibasilar mild atelectasis. CT chest abdomen pelvis 2022: Nil acute. Bibasilar atelectasis. _______________     IMPRESSION     1. No traumatic injury identified.     2. Bilateral lower lobe opacities could relate to atelectasis, infiltrate,  and/or aspiration. ABG with respiratory acidosis; will repeat ABG and adjust ventilator accordingly. Currently on assist control ventilator; will adjust after ABG. Sedation: Currently on propofol drip in ER; will change to Versed drip. Versed/fentanyl as needed. When seizure controlled, may consider stopping sedation and evaluate for SBT tomorrow morning. Ventilator bundle & Sedation protocol followed. Daily sedation holiday, assessment for readiness for SBT and then re-titrate if required. Chlorhexidine mouth washes. Keep SPO2 >=92%.  HOB 30 degree elevation all the time. Aggressive pulmonary toileting. Aspiration precautions. CVS:   Blood pressure mildly elevated; monitor. Start norvasc  ID:   CXr stable   Finish short course of unasyn    Thought to have mild aspiration; but no aspiration noted at the time of intubation per Dr. Virginia Gamez. Received clindamycin x1 in ER. No fever or leukocytosis. No signs of infection. Hematology/Oncology: Normal hemoglobin, platelets and coags. Monitor. Renal: JAVIER  Mildly elevated creatinine; cpk elevated continue hydration  Mild hypernatremia, expected to improve with IVF. GI/: No acute issues. Endocrine: Monitor BS. SSI. Neurology:  Last night there was a question of seizures but neurology evalauted stable  Wean to extubate   History of seizure with recurrent hospitalization; last [facility] hospitalization 04/2022. Multiple seizures/status epilepticus. CTA head and neck nil acute. Received Keppra in ER. Currently on propofol drip; will change to Versed drip. Will defer antiseizure medications to neurologist.  Neurology Dr. Keren Raza consulted from ER. Psychiatry: No acute issues. Toxicology: Urine drug screen positive for THC and benzo; patient received benzo before drug screen test.    Pain/Sedation: Sedation protocol as above. Skin/Wound: Contusion on left eyebrow and abrasion on the left upper extremity. Electrolytes: Replace electrolytes per ICU electrolyte replacement protocol. IVF: NS at 100 mL/h. On admission hold today     Nutrition: N.p.o.    Prophylaxis: DVT Prophylaxis: SCD/Lovenox. GI Prophylaxis: Protonix. Restraints:   Wrist soft restraints for patient interfering with medical therapy/management and patient safety. Lines/Tubes: PIV  ETT: 7/4/2022. Contreras: To be placed 7/4/2022 (Medically necessary for strict input/output monitoring in critically ill patient, will remove it when not needed. Contreras bundle followed).     Advance Directive/Palliative Care: Per clinical course. Will defer respective systems problem management to primary and other respective consultant and follow patient in ICU with primary and other medical team.  Further recommendations will be based on the patient's response to recommended treatment and results of the investigation ordered. Quality Care: PPI, DVT prophylaxis, HOB elevated, Infection control all reviewed and addressed. Care of plan d/w RN, RT, MDR, Dr. Alicia Wynne, Dr. Celi Romero  High complexity decision making was performed during the evaluation of this patient at high risk for decompensation with multiple organ involvement. Total critical care time spent rendering care exclusive of procedures/family discussion/coordination of care: 46 minutes. Subjective/History of Present Illness:     Patient is a 33 y.o. male with PMHx significant for asthma, seizure, s/p vagal nerve stimulator, smoker, polysubstance abuse; being admitted for status epilepticus. Patient was found unresponsive by paramedics; head to seizure episodes in ER; received Keppra and benzo and intubated for airway protection due to status epilepticus. Recent [place] hospitalization for seizure in 04/2022, and at [facility] in 09/2021.    7/7/2022 :   Seen in ER room #6. Wean to extubate today  ABg stable   Following commands off sedation   I/O last 24 hrs: Intake/Output Summary (Last 24 hours) at 7/7/2022 0945  Last data filed at 7/7/2022 0400  Gross per 24 hour   Intake 1878.41 ml   Output 1725 ml   Net 153.41 ml         The patient is critically ill and can not provide additional history due to Ventilated    History taken from EMR     Review of Systems:  ROS not obtained due to patient factor. No Known Allergies   Past Medical History:   Diagnosis Date    Asthma     Seizure ([address]     Status post VNS (vagus nerve stimulator) placement       No past surgical history on file.    Social History     Tobacco Use    Smoking status: Not on file    Smokeless tobacco: Not on file   Substance Use Topics    Alcohol use: Not on file      No family history on file. Prior to Admission medications    Medication Sig Start Date End Date Taking? Authorizing Provider   carBAMazepine (TEGretoL) 200 mg tablet Take 200 mg by mouth three (3) times daily.     Wallace, MD Ángela     Current Facility-Administered Medications   Medication Dose Route Frequency    phenytoin (DILANTIN) injection 100 mg  100 mg IntraVENous BID    valproic acid (as sodium salt) (DEPAKENE) 250 mg/5 mL (5 mL) oral solution 1,000 mg  1,000 mg Per NG tube Q12H    insulin lispro (HUMALOG) injection   SubCUTAneous Q6H    dextrose 5 % - 0.45% NaCl infusion  25 mL/hr IntraVENous CONTINUOUS    arformoteroL (BROVANA) neb solution 15 mcg  15 mcg Nebulization BID RT    ampicillin-sulbactam (UNASYN) 3 g in 0.9% sodium chloride (MBP/ADV) 100 mL MBP  3 g IntraVENous Q6H    [Held by provider] propofol (DIPRIVAN) 10 mg/mL infusion  0-50 mcg/kg/min IntraVENous TITRATE    [Held by provider] midazolam in normal saline (VERSED) 1 mg/mL infusion  0-5 mg/hr IntraVENous TITRATE    chlorhexidine (PERIDEX) 0.12 % mouthwash 10 mL  10 mL Oral Q12H    pantoprazole (PROTONIX) 40 mg in 0.9% sodium chloride 10 mL injection  40 mg IntraVENous Q24H    [Held by provider] 0.9% sodium chloride infusion  100 mL/hr IntraVENous CONTINUOUS    enoxaparin (LOVENOX) injection 40 mg  40 mg SubCUTAneous Q24H    [Held by provider] fentaNYL (PF) 900 mcg/30 ml infusion soln  0-50 mcg/hr IntraVENous TITRATE    lacosamide (VIMPAT) 200 mg in 0.9% sodium chloride 100 mL IVPB  200 mg IntraVENous BID         Objective:   Vital Signs:    Visit Vitals  BP (!) 141/79   Pulse 75   Temp 98.5 °F (36.9 °C)   Resp 15   Ht 5' 5.98\" (1.676 m)   Wt 81.6 kg (179 lb 14.3 oz)   SpO2 100%   BMI 29.05 kg/m²       O2 Device: Endotracheal tube,Ventilator       Temp (24hrs), Av.8 °F (37.1 °C), Min:98.2 °F (36.8 °C), Max:99.1 °F (37.3 °C) Intake/Output:   Last shift:      No intake/output data recorded. Last 3 shifts: 07/05 1901 - 07/07 0700  In: 4049.2 [I.V.:4049.2]  Out: 6037 [Urine:3675]      Intake/Output Summary (Last 24 hours) at 7/7/2022 0945  Last data filed at 7/7/2022 0400  Gross per 24 hour   Intake 1878.41 ml   Output 1725 ml   Net 153.41 ml       Last 3 Recorded Weights in this Encounter    07/04/22 0706 07/04/22 1923   Weight: 81.6 kg (179 lb 14.3 oz) 81.6 kg (179 lb 14.3 oz)         Ventilator Settings:  Mode Rate Tidal Volume Pressure FiO2 PEEP   Spontaneous (weaning trial)   450 ml  7 cm H2O (lowered by Dr Bacilio Barillas due to -1000) 25 % 5 cm H20     Peak airway pressure: 15 cm H2O    Plateau pressure:     Tidal volume:    Minute ventilation: 6.78 l/min     Recent Labs     07/07/22  0528 07/06/22  0543 07/05/22  0427   PHI 7.42 7.41 7.44   PCO2I 35.9 36.9 31.1*   PO2I 105* 137* 97   HCO3I 23.0 23.6 21.0*   FIO2I 25 30 30       Physical Exam:     General/Neurology: Intubated, on ventilator, sedated. Moving all 4 extremities spontaneously. Opens eyes   Head:   Normocephalic, without obvious abnormality, atraumatic. Eye:   Pupils bilateral equal and reactive to light. No scleral icterus, no pallor, no cyanosis. Nose:   No sinus tenderness  Throat:  Lips, mucosa, and tongue normal. No oral thrush. Neck:   Supple, symmetric. No lymphadenopathy. Trachea midline  Lung: Moderate air entry bilateral equal. No rales. At the bsaes decresed brath sounds No rhonchi. No wheezing. No stridors. No prolongded expiration. No accessory muscle use. Heart:   Regular rate & rhythm. S1 S2 present. No murmur. No JVD. Abdomen:  Soft. NT. ND. +BS. No masses. Extremities:  No pedal edema. No cyanosis. No clubbing. Pulses: 2+ and symmetric in DP. Capillary refill: normal  Lymphatic:  No cervical or supraclavicular palpable lymphadenopathy. Musculoskeletal: No joint swelling. No tenderness.    Skin:   Color, texture, turgor normal. No rashes or lesions. Data:       Recent Results (from the past 24 hour(s))   GLUCOSE, POC    Collection Time: 07/06/22 11:41 AM   Result Value Ref Range    Glucose (POC) 87 70 - 110 mg/dL   CALCIUM, IONIZED    Collection Time: 07/06/22  1:35 PM   Result Value Ref Range    Ionized Calcium 1.23 1.12 - 1.32 MMOL/L   GLUCOSE, POC    Collection Time: 07/06/22  5:14 PM   Result Value Ref Range    Glucose (POC) 88 70 - 110 mg/dL   GLUCOSE, POC    Collection Time: 07/06/22 11:36 PM   Result Value Ref Range    Glucose (POC) 86 70 - 800 mg/dL   METABOLIC PANEL, COMPREHENSIVE    Collection Time: 07/07/22  4:20 AM   Result Value Ref Range    Sodium 143 136 - 145 mmol/L    Potassium 3.9 3.5 - 5.5 mmol/L    Chloride 114 (H) 100 - 111 mmol/L    CO2 24 21 - 32 mmol/L    Anion gap 5 3.0 - 18 mmol/L    Glucose 87 74 - 99 mg/dL    BUN 12 7.0 - 18 MG/DL    Creatinine 1.05 0.6 - 1.3 MG/DL    BUN/Creatinine ratio 11 (L) 12 - 20      GFR est AA >60 >60 ml/min/1.73m2    GFR est non-AA >60 >60 ml/min/1.73m2    Calcium 8.3 (L) 8.5 - 10.1 MG/DL    Bilirubin, total 0.3 0.2 - 1.0 MG/DL    ALT (SGPT) 14 (L) 16 - 61 U/L    AST (SGOT) 21 10 - 38 U/L    Alk.  phosphatase 59 45 - 117 U/L    Protein, total 5.9 (L) 6.4 - 8.2 g/dL    Albumin 2.5 (L) 3.4 - 5.0 g/dL    Globulin 3.4 2.0 - 4.0 g/dL    A-G Ratio 0.7 (L) 0.8 - 1.7     PHOSPHORUS    Collection Time: 07/07/22  4:20 AM   Result Value Ref Range    Phosphorus 4.7 2.5 - 4.9 MG/DL   CALCIUM, IONIZED    Collection Time: 07/07/22  4:20 AM   Result Value Ref Range    Ionized Calcium 1.13 1.12 - 1.32 MMOL/L   MAGNESIUM    Collection Time: 07/07/22  4:20 AM   Result Value Ref Range    Magnesium 2.1 1.6 - 2.6 mg/dL   GLUCOSE, POC    Collection Time: 07/07/22  5:25 AM   Result Value Ref Range    Glucose (POC) 89 70 - 110 mg/dL   BLOOD GAS, ARTERIAL POC    Collection Time: 07/07/22  5:28 AM   Result Value Ref Range    Device: ADULT VENT      FIO2 (POC) 25 %    pH (POC) 7.42 7.35 - 7.45      pCO2 (POC) 35.9 35.0 - 45.0 MMHG    pO2 (POC) 105 (H) 80 - 100 MMHG    HCO3 (POC) 23.0 22 - 26 MMOL/L    sO2 (POC) 98.2 (H) 92 - 97 %    Base deficit (POC) 1.2 mmol/L    Mode ASSIST CONTROL      Tidal volume 450 ml    Set Rate 151 bpm    PEEP/CPAP (POC) 5 cmH2O    Mean Airway Pressure 8.1 cmH2O    PIP (POC) 16      Allens test (POC) NOT APPLICABLE      Inspiratory Time 1 sec    Total resp. rate 15      Site RIGHT RADIAL      Patient temp. 98.5      Specimen type (POC) ARTERIAL      Performed by Teagan Riley    CBC W/O DIFF    Collection Time: 07/07/22  8:05 AM   Result Value Ref Range    WBC 6.8 4.6 - 13.2 K/uL    RBC 4.68 4.35 - 5.65 M/uL    HGB 14.5 13.0 - 16.0 g/dL    HCT 42.9 36.0 - 48.0 %    MCV 91.7 78.0 - 100.0 FL    MCH 31.0 24.0 - 34.0 PG    MCHC 33.8 31.0 - 37.0 g/dL    RDW 11.7 11.6 - 14.5 %    PLATELET 786 866 - 796 K/uL    MPV 11.4 9.2 - 11.8 FL    NRBC 0.0 0  WBC    ABSOLUTE NRBC 0.00 0.00 - 0.01 K/uL         Chemistry Recent Labs     07/07/22  0420 07/06/22  0507   GLU 87 103*    143   K 3.9 4.4   * 113*   CO2 24 24   BUN 12 10   CREA 1.05 1.11   CA 8.3* 8.1*   MG 2.1 2.2   PHOS 4.7  --    AGAP 5 6   BUCR 11* 9*   AP 59 70   TP 5.9* 6.2*   ALB 2.5* 3.1*   GLOB 3.4 3.1   AGRAT 0.7* 1.0        Lactic Acid No results found for: LAC  No results for input(s): LAC in the last 72 hours. Liver Enzymes Protein, total   Date Value Ref Range Status   07/07/2022 5.9 (L) 6.4 - 8.2 g/dL Final     Albumin   Date Value Ref Range Status   07/07/2022 2.5 (L) 3.4 - 5.0 g/dL Final     Globulin   Date Value Ref Range Status   07/07/2022 3.4 2.0 - 4.0 g/dL Final     A-G Ratio   Date Value Ref Range Status   07/07/2022 0.7 (L) 0.8 - 1.7   Final     Alk.  phosphatase   Date Value Ref Range Status   07/07/2022 59 45 - 117 U/L Final     Recent Labs     07/07/22  0420 07/06/22  0507   TP 5.9* 6.2*   ALB 2.5* 3.1*   GLOB 3.4 3.1   AGRAT 0.7* 1.0   AP 59 70        CBC w/Diff Recent Labs     07/07/22  0805 07/06/22  0507   WBC 6.8 9.2   RBC 4.68 4.39   HGB 14.5 14.4   HCT 42.9 41.4    149        Cardiac Enzymes No results found for: CPK, CK, CKMMB, CKMB, RCK3, CKMBT, CKNDX, CKND1, ANDRÉS, TROPT, TROIQ, ADAM, TROPT, TNIPOC, BNP, BNPP     BNP No results found for: BNP, BNPP, XBNPT     Coagulation No results for input(s): PTP, INR, APTT, INREXT, INREXT in the last 72 hours. Thyroid  No results found for: T4, T3U, TSH, TSHEXT, TSHEXT    No results found for: T4     Urinalysis Lab Results   Component Value Date/Time    Color YELLOW 07/04/2022 07:50 AM    Appearance CLEAR 07/04/2022 07:50 AM    Specific gravity 1.030 07/04/2022 07:50 AM    pH (UA) 5.5 07/04/2022 07:50 AM    Protein 30 (A) 07/04/2022 07:50 AM    Glucose 500 (A) 07/04/2022 07:50 AM    Ketone Negative 07/04/2022 07:50 AM    Bilirubin Negative 07/04/2022 07:50 AM    Urobilinogen 0.2 07/04/2022 07:50 AM    Nitrites Negative 07/04/2022 07:50 AM    Leukocyte Esterase Negative 07/04/2022 07:50 AM    Epithelial cells FEW 07/04/2022 07:50 AM    Bacteria Negative 07/04/2022 07:50 AM    WBC Negative 07/04/2022 07:50 AM    RBC 0 to 3 07/04/2022 07:50 AM        Micro  Recent Labs     07/06/22  0620   CULT PENDING     Recent Labs     07/06/22  0620   CULT PENDING          Culture data during this hospitalization. All Micro Results     Procedure Component Value Units Date/Time    CULTURE, RESPIRATORY/SPUTUM/BRONCH Lily Bridges [[id number]] Collected: 07/06/22 1840    Order Status: Completed Specimen: Sputum Updated: 07/07/22 0915     Special Requests: NO SPECIAL REQUESTS        GRAM STAIN 1+ WBCS SEEN         FEW EPITHELIAL CELLS SEEN         2+ GRAM POSITIVE RODS        Culture result: PENDING             CT HEAD WO CONT    Result Date: 7/4/2022  EXAM: CT head CLINICAL INDICATION/HISTORY: Fall, seizure COMPARISON: None.  TECHNIQUE: Axial CT imaging of the head was performed without intravenous contrast. One or more dose reduction techniques were used on this CT: automated exposure control, adjustment of the mAs and/or kVp according to patient size, and iterative reconstruction techniques. The specific techniques used on this CT exam have been documented in the patient's electronic medical record. Digital Imaging and Communications in Medicine (DICOM) format image data are available to nonaffiliated external healthcare facilities or entities on a secured, media free, reciprocally searchable basis with patient authorization for at least a 12-month period after this study. _______________ FINDINGS: BRAIN AND EXTRA-AXIAL SPACES: Brain stimulator electrodes identified. No intracranial hemorrhage, midline shift, or uncal herniation. There are no areas of abnormal parenchymal attenuation. There are no abnormal extra-axial fluid collections. CALVARIUM: Postsurgical changes involving right frontoparietal calvarium. SINUSES: Clear. OTHER: None. _______________     1. No acute intracranial abnormalities. CT CHEST ABD PELV W CONT    Result Date: 7/4/2022  EXAM: CT of the chest, abdomen, and pelvis CLINICAL INDICATION/HISTORY: Seizure, chest pain, abdominal pain COMPARISON: None. TECHNIQUE: Axial CT imaging of the chest, abdomen, and pelvis was performed with intravenous contrast. Multiplanar reformats were generated. One or more dose reduction techniques were used on this CT: automated exposure control, adjustment of the mAs and/or kVp according to patient size, and iterative reconstruction techniques. The specific techniques used on this CT exam have been documented in the patient's electronic medical record. Digital Imaging and Communications in Medicine (DICOM) format image data are available to nonaffiliated external healthcare facilities or entities on a secured, media free, reciprocally searchable basis with patient authorization for at least a 12-month period after this study. _______________ FINDINGS: CHEST: LUNGS: Patchy atelectasis and/or infiltrate in the lower lobes. AIRWAY: Endotracheal tube is in place. Secretions identified in the airway. PLEURA: Normal, with no effusion or pneumothorax. MEDIASTINUM: Normal heart size. No pericardial effusion. Vasculature is unremarkable. LYMPH NODES: No enlarged lymph nodes. OTHER: None. =============== ABDOMEN/PELVIS: LIVER, BILIARY: Liver is unremarkable. No biliary dilation. Gallbladder is unremarkable. PANCREAS: Unremarkable. SPLEEN: Unremarkable. ADRENALS: Unremarkable. KIDNEYS/URETERS/BLADDER: Unremarkable. PELVIC ORGANS: Unremarkable. LYMPH NODES: No enlarged lymph nodes. GASTROINTESTINAL TRACT: No bowel dilation or wall thickening. VASCULATURE: Unremarkable. BONES: No acute or aggressive osseous abnormalities identified. OTHER: None. _______________     1. No traumatic injury identified. 2. Bilateral lower lobe opacities could relate to atelectasis, infiltrate, and/or aspiration. CT SPINE CERV WO CONT    Result Date: 7/4/2022  EXAM: CT cervical spine CLINICAL INDICATION/HISTORY: Trauma, neck pain. COMPARISON: None. TECHNIQUE: Axial CT imaging of the cervical spine was performed from the skull base to the thoracic inlet without intravenous contrast. Multiplanar reformats were generated. One or more dose reduction techniques were used on this CT: automated exposure control, adjustment of the mAs and/or kVp according to patient size, and iterative reconstruction techniques. The specific techniques used on this CT exam have been documented in the patient's electronic medical record. Digital Imaging and Communications in Medicine (DICOM) format image data are available to nonaffiliated external healthcare facilities or entities on a secured, media free, reciprocally searchable basis with patient authorization for at least a 12-month period after this study. _______________ FINDINGS: VERTEBRAE AND DISCS: Alignment and vertebral height is maintained with no fracture or traumatic subluxation identified.  SPINAL CANAL AND FORAMINA: Patent without significant bony canal or foramina encroachment. PREVERTEBRAL SOFT TISSUES: No prevertebral soft tissue swelling. VISIBLE PORTIONS OF POSTERIOR FOSSA/BRAIN: Unremarkable. LUNG APICES: Clear. OTHER: None. _______________     1. No cervical spine fracture. XR CHEST PORT    Result Date: 7/4/2022  EXAM: CHEST RADIOGRAPH CLINICAL INDICATION/HISTORY: pain   > Additional: Chest pain COMPARISON: None TECHNIQUE: Portable frontal view of the chest _______________ FINDINGS: SUPPORT DEVICES: Endotracheal tube is in good position. HEART AND MEDIASTINUM: Heart size is normal. LUNGS AND PLEURAL SPACES: Bilateral basilar atelectasis and or infiltrate. No pleural effusion or pneumothorax. BONES AND SOFT TISSUES: Stimulator generator projects over the left chest. _______________     1. Endotracheal tube is in good position. 2. Bilateral basilar atelectasis and/or infiltrate. Images report reviewed by me:  CT (Most Recent) (CT chest reviewed by me) Results from Hospital Encounter encounter on 07/04/22    CT CHEST ABD PELV W CONT    Narrative  EXAM: CT of the chest, abdomen, and pelvis    CLINICAL INDICATION/HISTORY: Seizure, chest pain, abdominal pain    COMPARISON: None. TECHNIQUE: Axial CT imaging of the chest, abdomen, and pelvis was performed with  intravenous contrast. Multiplanar reformats were generated. One or more dose  reduction techniques were used on this CT: automated exposure control,  adjustment of the mAs and/or kVp according to patient size, and iterative  reconstruction techniques. The specific techniques used on this CT exam have  been documented in the patient's electronic medical record.  Digital Imaging and  Communications in Medicine (DICOM) format image data are available to  nonaffiliated external healthcare facilities or entities on a secured, media  free, reciprocally searchable basis with patient authorization for at least a  12-month period after this study.      _______________    FINDINGS:    CHEST:    LUNGS: Patchy atelectasis and/or infiltrate in the lower lobes. AIRWAY: Endotracheal tube is in place. Secretions identified in the airway. PLEURA: Normal, with no effusion or pneumothorax. MEDIASTINUM: Normal heart size. No pericardial effusion. Vasculature is  unremarkable. LYMPH NODES: No enlarged lymph nodes. OTHER: None.    ===============    ABDOMEN/PELVIS:    LIVER, BILIARY: Liver is unremarkable. No biliary dilation. Gallbladder is  unremarkable. PANCREAS: Unremarkable. SPLEEN: Unremarkable. ADRENALS: Unremarkable. KIDNEYS/URETERS/BLADDER: Unremarkable. PELVIC ORGANS: Unremarkable. LYMPH NODES: No enlarged lymph nodes. GASTROINTESTINAL TRACT: No bowel dilation or wall thickening. VASCULATURE: Unremarkable. BONES: No acute or aggressive osseous abnormalities identified. OTHER: None.    _______________    Impression  1. No traumatic injury identified. 2. Bilateral lower lobe opacities could relate to atelectasis, infiltrate,  and/or aspiration. CXR reviewed by me:  XR (Most Recent). CXR  reviewed by me and compared with previous CXR Results from Hospital Encounter encounter on 07/04/22    XR ABD (KUB)    Narrative  EXAM: Frontal view of the abdomen    CLINICAL INDICATION/HISTORY: OG tube placement    COMPARISON: None.    _______________    FINDINGS:    NG/OG tube is in place with the tip in the right abdomen in the region of the  gastric antrum. No bowel obstruction identified. _______________    Impression  1. NG/OG tube is in good position. ·Please note: Voice-recognition software may have been used to generate this report, which may have resulted in some phonetic-based errors in grammar and contents. Even though attempts were made to correct all the mistakes, some may have been missed, and remained in the body of the document.       Inocente Darnell MD  7/7/2022

## 2022-07-07 NOTE — PROGRESS NOTES
3658: per Dr Roman Crow, pt is to go on SBT to prepare for extubation. Will attempt SBT once pt is weaned off sedation. 7524: placed pt on 10/5, 25% SBT at this time, pt RSBI 21, RR 15. . Pt very anxious to get the tube out, educated pt on the process of getting the tube out. Pt otherwise doing well.    0901: PS lowered to 7 by Dr Roman Crow at this time, per MD no ABG needed. 0930: pt remains on sedation, RT ready to extubate when RN is/when pt is off sedation. Awaiting extubation readiness from RN    1101: pt extubated at this time to 4lpm NC. sats 99%, no distress noted, vent being kept in room for 48 hours in the event of emergency. Will continue to monitor.

## 2022-07-07 NOTE — PROGRESS NOTES
conducted a follow up visit with Tanner Lane, who is a 33 y.o.,male. Patient is still on a vent but on an SBT and will wake when spoken to. His father was at the bedside crying quietly \"I hate to see him like this. \"  Dad was afraid to speak to him anymore because he did not want to agitate him. Dad left to go to work. Continued the relationship of care and support. Listened empathically. Chart reviewed. Family expressed gratitude for Spiritual Care visit. Chaplains will continue to follow and will provide pastoral care as needed or requested.  recommends bedside caregivers page the  on duty if patient shows signs of acute spiritual or emotional distress. [address], [place].    Board Certified   [phone] - Office

## 2022-07-07 NOTE — PROGRESS NOTES
Bedside report received from [address]. Assumed care of pt at this time. Patient is intubated and currently on Propofol drip for sedation. Patient is restrained to protect integrity of lines. Patient has swelling to MD QUIQUE made aware, orders for duplex. Bedside shift change report given to Denise Mckenzie RN (oncoming nurse) by Florentino LARSEN RN (offgoing nurse). Report included the following information SBAR, Kardex and MAR.

## 2022-07-08 ENCOUNTER — APPOINTMENT (OUTPATIENT)
Dept: GENERAL RADIOLOGY | Age: 27
DRG: 053 | End: 2022-07-08
Attending: INTERNAL MEDICINE
Payer: MEDICAID

## 2022-07-08 LAB
ALBUMIN SERPL-MCNC: 3.1 G/DL (ref 3.4–5)
ALBUMIN/GLOB SERPL: 1 {RATIO} (ref 0.8–1.7)
ALP SERPL-CCNC: 64 U/L (ref 45–117)
ALT SERPL-CCNC: 16 U/L (ref 16–61)
ANION GAP SERPL CALC-SCNC: 5 MMOL/L (ref 3–18)
AST SERPL-CCNC: 23 U/L (ref 10–38)
BILIRUB SERPL-MCNC: 0.5 MG/DL (ref 0.2–1)
BUN SERPL-MCNC: 10 MG/DL (ref 7–18)
BUN/CREAT SERPL: 11 (ref 12–20)
CA-I SERPL-SCNC: 1.23 MMOL/L (ref 1.12–1.32)
CALCIUM SERPL-MCNC: 8.7 MG/DL (ref 8.5–10.1)
CHLORIDE SERPL-SCNC: 110 MMOL/L (ref 100–111)
CO2 SERPL-SCNC: 25 MMOL/L (ref 21–32)
CREAT SERPL-MCNC: 0.91 MG/DL (ref 0.6–1.3)
ERYTHROCYTE [DISTWIDTH] IN BLOOD BY AUTOMATED COUNT: 11 % (ref 11.6–14.5)
GLOBULIN SER CALC-MCNC: 3.2 G/DL (ref 2–4)
GLUCOSE BLD STRIP.AUTO-MCNC: 85 MG/DL (ref 70–110)
GLUCOSE BLD STRIP.AUTO-MCNC: 85 MG/DL (ref 70–110)
GLUCOSE BLD STRIP.AUTO-MCNC: 88 MG/DL (ref 70–110)
GLUCOSE BLD STRIP.AUTO-MCNC: 99 MG/DL (ref 70–110)
GLUCOSE SERPL-MCNC: 93 MG/DL (ref 74–99)
HCT VFR BLD AUTO: 36.9 % (ref 36–48)
HGB BLD-MCNC: 12.7 G/DL (ref 13–16)
MAGNESIUM SERPL-MCNC: 2.1 MG/DL (ref 1.6–2.6)
MCH RBC QN AUTO: 31.5 PG (ref 24–34)
MCHC RBC AUTO-ENTMCNC: 34.4 G/DL (ref 31–37)
MCV RBC AUTO: 91.6 FL (ref 78–100)
NRBC # BLD: 0 K/UL (ref 0–0.01)
NRBC BLD-RTO: 0 PER 100 WBC
PHOSPHATE SERPL-MCNC: 3.5 MG/DL (ref 2.5–4.9)
PLATELET # BLD AUTO: 170 K/UL (ref 135–420)
PMV BLD AUTO: 11.1 FL (ref 9.2–11.8)
POTASSIUM SERPL-SCNC: 3.9 MMOL/L (ref 3.5–5.5)
PROT SERPL-MCNC: 6.3 G/DL (ref 6.4–8.2)
RBC # BLD AUTO: 4.03 M/UL (ref 4.35–5.65)
SODIUM SERPL-SCNC: 140 MMOL/L (ref 136–145)
WBC # BLD AUTO: 6.6 K/UL (ref 4.6–13.2)

## 2022-07-08 PROCEDURE — 82330 ASSAY OF CALCIUM: CPT

## 2022-07-08 PROCEDURE — 74011250636 HC RX REV CODE- 250/636: Performed by: FAMILY MEDICINE

## 2022-07-08 PROCEDURE — 83735 ASSAY OF MAGNESIUM: CPT

## 2022-07-08 PROCEDURE — 74011250637 HC RX REV CODE- 250/637: Performed by: PSYCHIATRY & NEUROLOGY

## 2022-07-08 PROCEDURE — 94640 AIRWAY INHALATION TREATMENT: CPT

## 2022-07-08 PROCEDURE — 85027 COMPLETE CBC AUTOMATED: CPT

## 2022-07-08 PROCEDURE — C9254 INJECTION, LACOSAMIDE: HCPCS | Performed by: PSYCHIATRY & NEUROLOGY

## 2022-07-08 PROCEDURE — 74011000258 HC RX REV CODE- 258: Performed by: PSYCHIATRY & NEUROLOGY

## 2022-07-08 PROCEDURE — 82962 GLUCOSE BLOOD TEST: CPT

## 2022-07-08 PROCEDURE — 77010033678 HC OXYGEN DAILY

## 2022-07-08 PROCEDURE — C9113 INJ PANTOPRAZOLE SODIUM, VIA: HCPCS | Performed by: INTERNAL MEDICINE

## 2022-07-08 PROCEDURE — 74011250636 HC RX REV CODE- 250/636: Performed by: INTERNAL MEDICINE

## 2022-07-08 PROCEDURE — 74011000250 HC RX REV CODE- 250: Performed by: INTERNAL MEDICINE

## 2022-07-08 PROCEDURE — 74011250637 HC RX REV CODE- 250/637: Performed by: INTERNAL MEDICINE

## 2022-07-08 PROCEDURE — 65270000046 HC RM TELEMETRY

## 2022-07-08 PROCEDURE — 84100 ASSAY OF PHOSPHORUS: CPT

## 2022-07-08 PROCEDURE — 36415 COLL VENOUS BLD VENIPUNCTURE: CPT

## 2022-07-08 PROCEDURE — 74011250637 HC RX REV CODE- 250/637: Performed by: FAMILY MEDICINE

## 2022-07-08 PROCEDURE — 80053 COMPREHEN METABOLIC PANEL: CPT

## 2022-07-08 PROCEDURE — 74011250636 HC RX REV CODE- 250/636: Performed by: PSYCHIATRY & NEUROLOGY

## 2022-07-08 RX ORDER — POTASSIUM CHLORIDE 7.45 MG/ML
10 INJECTION INTRAVENOUS
Status: COMPLETED | OUTPATIENT
Start: 2022-07-08 | End: 2022-07-08

## 2022-07-08 RX ORDER — AMLODIPINE BESYLATE 2.5 MG/1
2.5 TABLET ORAL DAILY
Status: DISCONTINUED | OUTPATIENT
Start: 2022-07-08 | End: 2022-07-09 | Stop reason: HOSPADM

## 2022-07-08 RX ADMIN — AMLODIPINE BESYLATE 2.5 MG: 5 TABLET ORAL at 11:01

## 2022-07-08 RX ADMIN — ARFORMOTEROL TARTRATE 15 MCG: 15 SOLUTION RESPIRATORY (INHALATION) at 07:15

## 2022-07-08 RX ADMIN — DEXTROSE MONOHYDRATE AND SODIUM CHLORIDE 25 ML/HR: 5; .45 INJECTION, SOLUTION INTRAVENOUS at 20:22

## 2022-07-08 RX ADMIN — BUDESONIDE 500 MCG: 0.5 INHALANT RESPIRATORY (INHALATION) at 07:15

## 2022-07-08 RX ADMIN — CLONAZEPAM 0.5 MG: 0.5 TABLET, ORALLY DISINTEGRATING ORAL at 21:46

## 2022-07-08 RX ADMIN — SODIUM CHLORIDE 200 MG: 9 INJECTION, SOLUTION INTRAVENOUS at 21:48

## 2022-07-08 RX ADMIN — PHENYTOIN SODIUM 100 MG: 50 INJECTION INTRAMUSCULAR; INTRAVENOUS at 08:23

## 2022-07-08 RX ADMIN — POTASSIUM CHLORIDE 10 MEQ: 7.46 INJECTION, SOLUTION INTRAVENOUS at 06:32

## 2022-07-08 RX ADMIN — SODIUM CHLORIDE 200 MG: 9 INJECTION, SOLUTION INTRAVENOUS at 09:40

## 2022-07-08 RX ADMIN — VALPROIC ACID 1000 MG: 250 SOLUTION ORAL at 08:23

## 2022-07-08 RX ADMIN — AMOXICILLIN AND CLAVULANATE POTASSIUM 1 TABLET: 875; 125 TABLET, FILM COATED ORAL at 20:25

## 2022-07-08 RX ADMIN — VALPROIC ACID 1000 MG: 250 SOLUTION ORAL at 20:26

## 2022-07-08 RX ADMIN — ENOXAPARIN SODIUM 40 MG: 100 INJECTION SUBCUTANEOUS at 11:47

## 2022-07-08 RX ADMIN — SODIUM CHLORIDE 40 MG: 9 INJECTION, SOLUTION INTRAMUSCULAR; INTRAVENOUS; SUBCUTANEOUS at 11:01

## 2022-07-08 RX ADMIN — AMOXICILLIN AND CLAVULANATE POTASSIUM 1 TABLET: 875; 125 TABLET, FILM COATED ORAL at 08:22

## 2022-07-08 RX ADMIN — ZONISAMIDE 300 MG: 100 CAPSULE ORAL at 20:25

## 2022-07-08 NOTE — PROGRESS NOTES
Physician Progress Note      PATIENT:               Sigrid Collins  CSN #:                  [id number]  :                       1995  ADMIT DATE:       2022 6:29 AM  DISCH DATE:  RESPONDING  PROVIDER #:        Angela Chao MD          QUERY TEXT:    Pt admitted with  Status epilepticus and has respiratory failure documented. If possible, please document in progress notes and discharge summary further specificity regarding the type and acuity of respiratory failure:     The medical record reflects the following:    Risk Factors: History of asthma and smoker    Clinical Indicators:  > Per Pulmonary- Respiratory: Intubated for airway protection due to multiple seizures, status epilepticus  > Per PN Respiratory failure  > ABGs 2022 08:04  pH (POC): 7.18 (LL)  pCO2 (POC): 53.9 (H)  pO2 (POC): 136 (H)  HCO3 (POC): 20.3 (L)  sO2 (POC): 98.2 (H)  > RR 15-39    Treatment: receiving- Intubated for airway protection, Pulmonary consult,  ABGs, Albuterol    Thank you,  Petros Fontana RN, BSN, CRCR  [email or web address]  Options provided:  -- Acute respiratory failure with hypoxia  -- Acute respiratory failure with hypercapnia  -- Chronic respiratory failure with hypoxia  -- Chronic respiratory failure with hypercapnia  -- Acute on chronic respiratory failure with hypoxia  -- Acute on chronic respiratory failure with hypercapnia  -- Other - I will add my own diagnosis  -- Disagree - Not applicable / Not valid  -- Disagree - Clinically unable to determine / Unknown  -- Refer to Clinical Documentation Reviewer    PROVIDER RESPONSE TEXT:    Intubated to protect airway    Query created by: Madai Geiger on 2022 2:33 PM      Electronically signed by:  Angela Chao MD 2022 10:12 AM

## 2022-07-08 NOTE — PROGRESS NOTES
[facility]  Pulmonary, Critical Care, and Sleep Medicine    Name: Ara Brown MRN: [id number]   : 1995 Hospital: [facility] [place]    Date: 2022  Room: [address] [place] [address] Note                                              Consult requesting physician: Dr. Jenna Carrera  Reason for Consult: Ventilator management, status epilepticus    IMPRESSION:     Patient Active Problem List   Diagnosis Code    Status epilepticus ([facility] [address] G40.901    Respiratory failure ([facility] [address] J96.90    Marijuana use F12.90    Hypernatremia E87.0   Code status: Full code    RECOMMENDATIONS:     Respiratory: Compensated respiratory status. On room air  Intubated for airway protection due to multiple seizures, status epilepticus, encephalopathy at admission  Extubated 2022  Keep SPO2 >=92%. HOB 30 degree elevation all the time. Aggressive pulmonary toileting. Aspiration precautions. Incentive spirometer encouraged and educated    CVS: Hemodynamically stable  Blood pressure fairly under control; monitor. Start norvasc    ID: CXr 2022 stable   Empirically started on Unasyn. Finish short course with Augmentin  Received clindamycin x1 in ER. No fever or leukocytosis. Hematology/Oncology: Drop in Hgb likely dilutional from IV fluid  No gross evidence of bleeding anywhere  Normal plt  Monitor CBC daily    Renal: JAVIER resolved  Improved S. Na+ with IV fluid  Monitor urine output, renal function, lites    GI/: No acute issues. Tolerating p.o. meds  Diet pending bedside nursing swallow evaluation    Endocrine: Monitor BS as needed    Neurology: No further episode of seizure overnight into today  History of seizure with recurrent hospitalization; last [facility] hospitalization [facility]22. Multiple seizures/status epilepticus. CTA head and neck nil acute. Received Keppra in ER. On Vimpat, Dilantin, Depakene, Zonegran. Manage antiseizure medications as per neurologist.    Psychiatry:  On Klonopin    Toxicology: Urine drug screen positive for THC and benzo; patient received benzo before drug screen test.    Skin/Wound: Contusion on left eyebrow and abrasion on the left upper extremity. As per nursing protocol    Electrolytes: Replace electrolytes per ICU electrolyte replacement protocol. Nutrition: Diet as per bedside nursing evaluation    Prophylaxis: DVT Prophylaxis: SCD/Lovenox. GI Prophylaxis: Protonix. Restraints: None    Lines/Tubes: PIV  ETT: 7/4/2022-7/7/2022  Contreras: 7/4/2022 (Contreras bundle followed). Remove Contreras today    Advance Directive/Palliative Care: Full code. Palliative care as per clinical course. Transfer to floor if okay with hospitalist.  We will sign off  Will defer respective systems problem management to primary and other respective consultant and follow patient in ICU with primary and other medical team.  Quality Care: PPI, DVT prophylaxis, HOB elevated, Infection control all reviewed and addressed. Care of plan d/w RN, MDR, hospitalist team    High complexity decision making was performed during the evaluation of this patient at high risk for decompensation with multiple organ involvement. Total critical care time spent rendering care exclusive of procedures/family discussion/coordination of care: 35 minutes. Subjective/History of Present Illness:   Patient is a 33 y.o. male with PMHx significant for asthma, seizure, s/p vagal nerve stimulator, smoker, polysubstance abuse; being admitted for status epilepticus. Patient was found unresponsive by paramedics; head to seizure episodes in ER; received Keppra and benzo and intubated for airway protection due to status epilepticus. Recent [facility] hospitalization for seizure in 04/2022, and at [facility] in 09/2021.    7/8/2022 :   Patient seen in ICU room #102  AAO x4, following all commands and answering appropriately  No overnight or this a.m. episode of seizure  Hemodynamically stable  No fever.   No bleeding anywhere  Remains n.p.o. pending nursing bedside evaluation  Good urine output  [facility] was not contacted by staff on anything about patient overnight. I/O last 24 hrs: Intake/Output Summary (Last 24 hours) at 7/8/2022 1019  Last data filed at 7/8/2022 0800  Gross per 24 hour   Intake 1169.3 ml   Output 2800 ml   Net -1630.7 ml         Review of Systems:  General ROS: negative for  - fever, chills, weight loss, fatigue and malaise  Cardiovascular ROS: negative for - chest pain, edema, murmur, orthopnea, palpitations or pnd  Gastrointestinal ROS: no nausea, vomiting, abdominal pain  Dermatological ROS: negative for - pruritus, rash or skin lesion changes         No Known Allergies     Past Medical History:   Diagnosis Date    Asthma     Seizure ([facility] [address]     Status post VNS (vagus nerve stimulator) placement       No past surgical history on file. Social History     Tobacco Use    Smoking status: Not on file    Smokeless tobacco: Not on file   Substance Use Topics    Alcohol use: Not on file      No family history on file. Prior to Admission medications    Medication Sig Start Date End Date Taking? Authorizing Provider   carBAMazepine (TEGretoL) 200 mg tablet Take 200 mg by mouth three (3) times daily.     Other, MD Ángela     Current Facility-Administered Medications   Medication Dose Route Frequency    budesonide (PULMICORT) 500 mcg/2 ml nebulizer suspension  500 mcg Nebulization BID RT    amoxicillin-clavulanate (AUGMENTIN) 875-125 mg per tablet 1 Tablet  1 Tablet Oral Q12H    phenytoin (DILANTIN) injection 100 mg  100 mg IntraVENous DAILY    clonazePAM (KlonoPIN) disintegrating tablet 0.5 mg  0.5 mg Oral QHS    zonisamide (ZONEGRAN) capsule 300 mg  300 mg Oral QHS    valproic acid (as sodium salt) (DEPAKENE) 250 mg/5 mL (5 mL) oral solution 1,000 mg  1,000 mg Oral Q12H    insulin lispro (HUMALOG) injection   SubCUTAneous Q6H    dextrose 5 % - 0.45% NaCl infusion  25 mL/hr IntraVENous CONTINUOUS    arformoteroL (BROVANA) neb solution 15 mcg  15 mcg Nebulization BID RT    [Held by provider] propofol (DIPRIVAN) 10 mg/mL infusion  0-50 mcg/kg/min IntraVENous TITRATE    [Held by provider] midazolam in normal saline (VERSED) 1 mg/mL infusion  0-5 mg/hr IntraVENous TITRATE    pantoprazole (PROTONIX) 40 mg in 0.9% sodium chloride 10 mL injection  40 mg IntraVENous Q24H    [Held by provider] 0.9% sodium chloride infusion  100 mL/hr IntraVENous CONTINUOUS    enoxaparin (LOVENOX) injection 40 mg  40 mg SubCUTAneous Q24H    [Held by provider] fentaNYL (PF) 900 mcg/30 ml infusion soln  0-50 mcg/hr IntraVENous TITRATE    lacosamide (VIMPAT) 200 mg in 0.9% sodium chloride 100 mL IVPB  200 mg IntraVENous BID         Objective:   Vital Signs:    Visit Vitals  BP (!) 146/93   Pulse 62   Temp 98.2 °F (36.8 °C)   Resp 13   Ht 5' 5.98\" (1.676 m)   Wt 81.6 kg (179 lb 14.3 oz)   SpO2 100%   BMI 29.05 kg/m²       O2 Device: Room air   Temp (24hrs), Av.4 °F (36.9 °C), Min:98.2 °F (36.8 °C), Max:98.6 °F (37 °C)       Intake/Output:   Last shift:      701 - 1900  In: 100 [I.V.:100]  Out: -     Last 3 shifts: 1901 -  07  In: 2319.7 [I.V.:2219.7]  Out: 7666 [Urine:3525]      Intake/Output Summary (Last 24 hours) at 2022 1019  Last data filed at 2022 0800  Gross per 24 hour   Intake 1169.3 ml   Output 2800 ml   Net -1630.7 ml       Last 3 Recorded Weights in this Encounter    22 0706 22   Weight: 81.6 kg (179 lb 14.3 oz) 81.6 kg (179 lb 14.3 oz)       Recent Labs     22  0522  0543   PHI 7.42 7.41   PCO2I 35.9 36.9   PO2I 105* 137*   HCO3I 23.0 23.6   FIO2I 25 30       Physical Exam:   General: AAO x 4, in no respiratory distress, cooperative, appears stated age, on room air  HEENT: PERRL, throat normal without erythema or exudate  Neck: No abnormally enlarged lymph nodes or thyroid, supple  Chest: normal  Lungs: moderate air entry, clear to auscultation bilaterally, normal percussion bilaterally, no tenderness/ rash  Heart: Regular rate and rhythm, S1S2 present, or without murmur or extra heart sounds  Abdomen: non-distended, bowel sounds normoactive, tympanic, abdomen is soft without significant tenderness, masses, organomegaly or guarding, rigidity, rebound  Extremity: no edema, no cyanosis; peripheral pulses 2+ and symmetric, capillary refill normal  Neuro: alert, oriented x 4, no defects noted in general exam.  Skin: Skin color, texture, turgor unchanged      Data:       Recent Results (from the past 24 hour(s))   GLUCOSE, POC    Collection Time: 07/07/22 12:50 PM   Result Value Ref Range    Glucose (POC) 89 70 - 110 mg/dL   GLUCOSE, POC    Collection Time: 07/07/22  6:11 PM   Result Value Ref Range    Glucose (POC) 92 70 - 110 mg/dL   POTASSIUM    Collection Time: 07/07/22  7:40 PM   Result Value Ref Range    Potassium 4.6 3.5 - 5.5 mmol/L   GLUCOSE, POC    Collection Time: 07/07/22 11:27 PM   Result Value Ref Range    Glucose (POC) 106 70 - 110 mg/dL   CBC W/O DIFF    Collection Time: 07/08/22  3:27 AM   Result Value Ref Range    WBC 6.6 4.6 - 13.2 K/uL    RBC 4.03 (L) 4.35 - 5.65 M/uL    HGB 12.7 (L) 13.0 - 16.0 g/dL    HCT 36.9 36.0 - 48.0 %    MCV 91.6 78.0 - 100.0 FL    MCH 31.5 24.0 - 34.0 PG    MCHC 34.4 31.0 - 37.0 g/dL    RDW 11.0 (L) 11.6 - 14.5 %    PLATELET 660 474 - 390 K/uL    MPV 11.1 9.2 - 11.8 FL    NRBC 0.0 0  WBC    ABSOLUTE NRBC 0.00 0.00 - 0.58 K/uL   METABOLIC PANEL, COMPREHENSIVE    Collection Time: 07/08/22  3:27 AM   Result Value Ref Range    Sodium 140 136 - 145 mmol/L    Potassium 3.9 3.5 - 5.5 mmol/L    Chloride 110 100 - 111 mmol/L    CO2 25 21 - 32 mmol/L    Anion gap 5 3.0 - 18 mmol/L    Glucose 93 74 - 99 mg/dL    BUN 10 7.0 - 18 MG/DL    Creatinine 0.91 0.6 - 1.3 MG/DL    BUN/Creatinine ratio 11 (L) 12 - 20      GFR est AA >60 >60 ml/min/1.73m2    GFR est non-AA >60 >60 ml/min/1.73m2    Calcium 8.7 8.5 - 10.1 MG/DL    Bilirubin, total 0.5 0.2 - 1.0 MG/DL    ALT (SGPT) 16 16 - 61 U/L    AST (SGOT) 23 10 - 38 U/L    Alk. phosphatase 64 45 - 117 U/L    Protein, total 6.3 (L) 6.4 - 8.2 g/dL    Albumin 3.1 (L) 3.4 - 5.0 g/dL    Globulin 3.2 2.0 - 4.0 g/dL    A-G Ratio 1.0 0.8 - 1.7     PHOSPHORUS    Collection Time: 07/08/22  3:27 AM   Result Value Ref Range    Phosphorus 3.5 2.5 - 4.9 MG/DL   MAGNESIUM    Collection Time: 07/08/22  3:27 AM   Result Value Ref Range    Magnesium 2.1 1.6 - 2.6 mg/dL   CALCIUM, IONIZED    Collection Time: 07/08/22  3:36 AM   Result Value Ref Range    Ionized Calcium 1.23 1.12 - 1.32 MMOL/L   GLUCOSE, POC    Collection Time: 07/08/22  6:08 AM   Result Value Ref Range    Glucose (POC) 99 70 - 110 mg/dL         Chemistry Recent Labs     07/08/22  0327 07/07/22  1940 07/07/22  0420 07/06/22  0507 07/06/22  0507   GLU 93  --  87  --  103*     --  143  --  143   K 3.9 4.6 3.9   < > 4.4     --  114*  --  113*   CO2 25  --  24  --  24   BUN 10  --  12  --  10   CREA 0.91  --  1.05  --  1.11   CA 8.7  --  8.3*  --  8.1*   MG 2.1  --  2.1  --  2.2   PHOS 3.5  --  4.7  --   --    AGAP 5  --  5  --  6   BUCR 11*  --  11*  --  9*   AP 64  --  59  --  70   TP 6.3*  --  5.9*  --  6.2*   ALB 3.1*  --  2.5*  --  3.1*   GLOB 3.2  --  3.4  --  3.1   AGRAT 1.0  --  0.7*  --  1.0    < > = values in this interval not displayed. Lactic Acid No results found for: LAC  No results for input(s): LAC in the last 72 hours. Liver Enzymes Protein, total   Date Value Ref Range Status   07/08/2022 6.3 (L) 6.4 - 8.2 g/dL Final     Albumin   Date Value Ref Range Status   07/08/2022 3.1 (L) 3.4 - 5.0 g/dL Final     Globulin   Date Value Ref Range Status   07/08/2022 3.2 2.0 - 4.0 g/dL Final     A-G Ratio   Date Value Ref Range Status   07/08/2022 1.0 0.8 - 1.7   Final     Alk.  phosphatase   Date Value Ref Range Status   07/08/2022 64 45 - 117 U/L Final     Recent Labs     07/08/22  0327 07/07/22  0420 07/06/22  0507   TP 6.3* 5.9* 6.2*   ALB 3.1* 2.5* 3.1*   GLOB 3.2 3.4 3.1   AGRAT 1.0 0.7* 1.0   AP 64 59 70        CBC w/Diff Recent Labs     07/08/22  0327 07/07/22  0805 07/06/22  0507   WBC 6.6 6.8 9.2   RBC 4.03* 4.68 4.39   HGB 12.7* 14.5 14.4   HCT 36.9 42.9 41.4    170 149        Cardiac Enzymes No results found for: CPK, CK, CKMMB, CKMB, RCK3, CKMBT, CKNDX, CKND1, ANDRÉS, TROPT, TROIQ, ADAM, TROPT, TNIPOC, BNP, BNPP     BNP No results found for: BNP, BNPP, XBNPT     Coagulation No results for input(s): PTP, INR, APTT, INREXT, INREXT in the last 72 hours. Thyroid  No results found for: T4, T3U, TSH, TSHEXT, TSHEXT    No results found for: T4     Urinalysis Lab Results   Component Value Date/Time    Color YELLOW 07/04/2022 07:50 AM    Appearance CLEAR 07/04/2022 07:50 AM    Specific gravity 1.030 07/04/2022 07:50 AM    pH (UA) 5.5 07/04/2022 07:50 AM    Protein 30 (A) 07/04/2022 07:50 AM    Glucose 500 (A) 07/04/2022 07:50 AM    Ketone Negative 07/04/2022 07:50 AM    Bilirubin Negative 07/04/2022 07:50 AM    Urobilinogen 0.2 07/04/2022 07:50 AM    Nitrites Negative 07/04/2022 07:50 AM    Leukocyte Esterase Negative 07/04/2022 07:50 AM    Epithelial cells FEW 07/04/2022 07:50 AM    Bacteria Negative 07/04/2022 07:50 AM    WBC Negative 07/04/2022 07:50 AM    RBC 0 to 3 07/04/2022 07:50 AM        Micro  Recent Labs     07/06/22  0620   CULT LIGHT PSEUDOMONAS AERUGINOSA*  MODERATE STAPHYLOCOCCUS AUREUS*  SCANT PROBABLE PROTEUS SPECIES*  MODERATE NORMAL RESPIRATORY HENRIQUE     Recent Labs     07/06/22 0620   CULT LIGHT PSEUDOMONAS AERUGINOSA*  MODERATE STAPHYLOCOCCUS AUREUS*  SCANT PROBABLE PROTEUS SPECIES*  MODERATE NORMAL RESPIRATORY HENRIQUE          Culture data during this hospitalization.    All Micro Results     Procedure Component Value Units Date/Time    CULTURE, RESPIRATORY/SPUTUM/BRONCH [place] Ariana STAIN [[id number]]  (Abnormal)  (Susceptibility) Collected: 07/06/22 0620    Order Status: Completed Specimen: Sputum Updated: 07/08/22 0714     Special Requests: NO SPECIAL REQUESTS        GRAM STAIN 1+ WBCS SEEN         FEW EPITHELIAL CELLS SEEN         2+ GRAM POSITIVE RODS        Culture result:       LIGHT PSEUDOMONAS AERUGINOSA                  MODERATE STAPHYLOCOCCUS AUREUS                  SCANT PROBABLE PROTEUS SPECIES                  MODERATE NORMAL RESPIRATORY HENRIQUE                   CT HEAD WO CONT    Result Date: 7/4/2022  EXAM: CT head CLINICAL INDICATION/HISTORY: Fall, seizure COMPARISON: None. TECHNIQUE: Axial CT imaging of the head was performed without intravenous contrast. One or more dose reduction techniques were used on this CT: automated exposure control, adjustment of the mAs and/or kVp according to patient size, and iterative reconstruction techniques. The specific techniques used on this CT exam have been documented in the patient's electronic medical record. Digital Imaging and Communications in Medicine (DICOM) format image data are available to nonaffiliated external healthcare facilities or entities on a secured, media free, reciprocally searchable basis with patient authorization for at least a 12-month period after this study. _______________ FINDINGS: BRAIN AND EXTRA-AXIAL SPACES: Brain stimulator electrodes identified. No intracranial hemorrhage, midline shift, or uncal herniation. There are no areas of abnormal parenchymal attenuation. There are no abnormal extra-axial fluid collections. CALVARIUM: Postsurgical changes involving right frontoparietal calvarium. SINUSES: Clear. OTHER: None. _______________     1. No acute intracranial abnormalities. CT CHEST ABD PELV W CONT    Result Date: 7/4/2022  EXAM: CT of the chest, abdomen, and pelvis CLINICAL INDICATION/HISTORY: Seizure, chest pain, abdominal pain COMPARISON: None. TECHNIQUE: Axial CT imaging of the chest, abdomen, and pelvis was performed with intravenous contrast. Multiplanar reformats were generated.  One or more dose reduction techniques were used on this CT: automated exposure control, adjustment of the mAs and/or kVp according to patient size, and iterative reconstruction techniques. The specific techniques used on this CT exam have been documented in the patient's electronic medical record. Digital Imaging and Communications in Medicine (DICOM) format image data are available to nonaffiliated external healthcare facilities or entities on a secured, media free, reciprocally searchable basis with patient authorization for at least a 12-month period after this study. _______________ FINDINGS: CHEST: LUNGS: Patchy atelectasis and/or infiltrate in the lower lobes. AIRWAY: Endotracheal tube is in place. Secretions identified in the airway. PLEURA: Normal, with no effusion or pneumothorax. MEDIASTINUM: Normal heart size. No pericardial effusion. Vasculature is unremarkable. LYMPH NODES: No enlarged lymph nodes. OTHER: None. =============== ABDOMEN/PELVIS: LIVER, BILIARY: Liver is unremarkable. No biliary dilation. Gallbladder is unremarkable. PANCREAS: Unremarkable. SPLEEN: Unremarkable. ADRENALS: Unremarkable. KIDNEYS/URETERS/BLADDER: Unremarkable. PELVIC ORGANS: Unremarkable. LYMPH NODES: No enlarged lymph nodes. GASTROINTESTINAL TRACT: No bowel dilation or wall thickening. VASCULATURE: Unremarkable. BONES: No acute or aggressive osseous abnormalities identified. OTHER: None. _______________     1. No traumatic injury identified. 2. Bilateral lower lobe opacities could relate to atelectasis, infiltrate, and/or aspiration. CT SPINE CERV WO CONT    Result Date: 7/4/2022  EXAM: CT cervical spine CLINICAL INDICATION/HISTORY: Trauma, neck pain. COMPARISON: None. TECHNIQUE: Axial CT imaging of the cervical spine was performed from the skull base to the thoracic inlet without intravenous contrast. Multiplanar reformats were generated.  One or more dose reduction techniques were used on this CT: automated exposure control, adjustment of the mAs and/or kVp according to patient size, and iterative reconstruction techniques. The specific techniques used on this CT exam have been documented in the patient's electronic medical record. Digital Imaging and Communications in Medicine (DICOM) format image data are available to nonaffiliated external healthcare facilities or entities on a secured, media free, reciprocally searchable basis with patient authorization for at least a 12-month period after this study. _______________ FINDINGS: VERTEBRAE AND DISCS: Alignment and vertebral height is maintained with no fracture or traumatic subluxation identified. SPINAL CANAL AND FORAMINA: Patent without significant bony canal or foramina encroachment. PREVERTEBRAL SOFT TISSUES: No prevertebral soft tissue swelling. VISIBLE PORTIONS OF POSTERIOR FOSSA/BRAIN: Unremarkable. LUNG APICES: Clear. OTHER: None. _______________     1. No cervical spine fracture. XR CHEST PORT    Result Date: 7/4/2022  EXAM: CHEST RADIOGRAPH CLINICAL INDICATION/HISTORY: pain   > Additional: Chest pain COMPARISON: None TECHNIQUE: Portable frontal view of the chest _______________ FINDINGS: SUPPORT DEVICES: Endotracheal tube is in good position. HEART AND MEDIASTINUM: Heart size is normal. LUNGS AND PLEURAL SPACES: Bilateral basilar atelectasis and or infiltrate. No pleural effusion or pneumothorax. BONES AND SOFT TISSUES: Stimulator generator projects over the left chest. _______________     1. Endotracheal tube is in good position. 2. Bilateral basilar atelectasis and/or infiltrate. Images report reviewed by me:  CT (Most Recent) (CT chest reviewed by me) Results from Hospital Encounter encounter on 07/04/22    CT CHEST ABD PELV W CONT    Narrative  EXAM: CT of the chest, abdomen, and pelvis    CLINICAL INDICATION/HISTORY: Seizure, chest pain, abdominal pain    COMPARISON: None.     TECHNIQUE: Axial CT imaging of the chest, abdomen, and pelvis was performed with  intravenous contrast. Multiplanar reformats were generated. One or more dose  reduction techniques were used on this CT: automated exposure control,  adjustment of the mAs and/or kVp according to patient size, and iterative  reconstruction techniques. The specific techniques used on this CT exam have  been documented in the patient's electronic medical record. Digital Imaging and  Communications in Medicine (DICOM) format image data are available to  nonaffiliated external healthcare facilities or entities on a secured, media  free, reciprocally searchable basis with patient authorization for at least a  12-month period after this study. _______________    FINDINGS:    CHEST:    LUNGS: Patchy atelectasis and/or infiltrate in the lower lobes. AIRWAY: Endotracheal tube is in place. Secretions identified in the airway. PLEURA: Normal, with no effusion or pneumothorax. MEDIASTINUM: Normal heart size. No pericardial effusion. Vasculature is  unremarkable. LYMPH NODES: No enlarged lymph nodes. OTHER: None.    ===============    ABDOMEN/PELVIS:    LIVER, BILIARY: Liver is unremarkable. No biliary dilation. Gallbladder is  unremarkable. PANCREAS: Unremarkable. SPLEEN: Unremarkable. ADRENALS: Unremarkable. KIDNEYS/URETERS/BLADDER: Unremarkable. PELVIC ORGANS: Unremarkable. LYMPH NODES: No enlarged lymph nodes. GASTROINTESTINAL TRACT: No bowel dilation or wall thickening. VASCULATURE: Unremarkable. BONES: No acute or aggressive osseous abnormalities identified. OTHER: None.    _______________    Impression  1. No traumatic injury identified. 2. Bilateral lower lobe opacities could relate to atelectasis, infiltrate,  and/or aspiration. CXR reviewed by me:  XR (Most Recent).  CXR  reviewed by me and compared with previous CXR Results from Hospital Encounter encounter on 07/04/22    XR ABD (KUB)    Narrative  EXAM: Frontal view of the abdomen    CLINICAL INDICATION/HISTORY: OG tube placement    COMPARISON: None.    _______________    FINDINGS:    NG/OG tube is in place with the tip in the right abdomen in the region of the  gastric antrum. No bowel obstruction identified. _______________    Impression  1. NG/OG tube is in good position. ·Please note: Voice-recognition software may have been used to generate this report, which may have resulted in some phonetic-based errors in grammar and contents. Even though attempts were made to correct all the mistakes, some may have been missed, and remained in the body of the document.       Stephanie Ervin MD  7/8/2022

## 2022-07-08 NOTE — PROGRESS NOTES
Hospitalist Progress Note    Patient: Mariana Sánchez MRN: [id number]  CSN: [id number]    YOB: 1995  Age: 33 y.o. Sex: male    DOA: 7/4/2022 LOS:  LOS: 4 days                Assessment/Plan     Patient Active Problem List   Diagnosis Code    Status epilepticus ([facility] [address] G40.901    Respiratory failure ([facility] [address] J96.90    Marijuana use F12.90    Hypernatremia E87.0        Chief complaint :  Seizures  33 y.o. male with seizure disorder, status post vagus nerve stimulator placement, asthma is brought to ER by EMS as he was found unresponsive by paramedics. Admitted for status epilepticus. Awake, Alert, following commands.     Resp -   Extubated 07/07/2022.   Stable on room air     ID -   Concern for aspiration in ER  Received clindamycin in ER  On augmentin.     [facility] - Monitor HD.       Heme/onc - Follow H&H, plts.      Renal - Trend BUN, Cr, follow I/O, hay in place. Check and replace Mg, K, phos.     Endocrine -  Follow FSG     Neuro/ Pain/ Sedation -   Status epilepticus -  Neurology following  Had seizure again after admission  On vimpat, valproic acid. Stopped dilantin  zonegran and klonopin nightly.          His urine drug screen positive for THC, benzodiazepine.     GI - SLP eval, diet per SLP.    Prophylaxis - DVT :lovenox, GI: protonix     Transfer to floor and possible discharge tomorrow    Disposition : 1-2    Review of systems  General: No fevers or chills. Cardiovascular: No chest pain or pressure. No palpitations. Pulmonary: No shortness of breath. Gastrointestinal: No nausea, vomiting. Physical Exam:  General: Awake, cooperative, no acute distress    HEENT: NC, Atraumatic. PERRLA, anicteric sclerae. Lungs: CTA Bilaterally. No Wheezing/Rhonchi/Rales. Heart:  S1 S2,  No murmur, No Rubs, No Gallops  Abdomen: Soft, Non distended, Non tender.  +Bowel sounds,   Extremities: No c/c/e  Psych:   Not anxious or agitated. Neurologic:  No acute neurological deficit.            Vital signs/Intake and Output:  Visit Vitals  /74   Pulse 65   Temp 98.9 °F (37.2 °C)   Resp 11   Ht 5' 5.98\" (1.676 m)   Wt 81.6 kg (179 lb 14.3 oz)   SpO2 99%   BMI 29.05 kg/m²     Current Shift:  07/08 0701 - 07/08 1900  In: 352.5 [I.V.:352.5]  Out: 550 [Urine:550]  Last three shifts:  07/06 1901 - 07/08 0700  In: 2319.7 [I.V.:2219.7]  Out: 6619 [XQZKR:5029]            Labs: Results:       Chemistry Recent Labs     07/08/22 0327 07/07/22  1940 07/07/22  0420 07/06/22  0507 07/06/22  0507   GLU 93  --  87  --  103*     --  143  --  143   K 3.9 4.6 3.9   < > 4.4     --  114*  --  113*   CO2 25  --  24  --  24   BUN 10  --  12  --  10   CREA 0.91  --  1.05  --  1.11   CA 8.7  --  8.3*  --  8.1*   AGAP 5  --  5  --  6   BUCR 11*  --  11*  --  9*   AP 64  --  59  --  70   TP 6.3*  --  5.9*  --  6.2*   ALB 3.1*  --  2.5*  --  3.1*   GLOB 3.2  --  3.4  --  3.1   AGRAT 1.0  --  0.7*  --  1.0    < > = values in this interval not displayed. CBC w/Diff Recent Labs     07/08/22 0327 07/07/22  0805 07/06/22  0507   WBC 6.6 6.8 9.2   RBC 4.03* 4.68 4.39   HGB 12.7* 14.5 14.4   HCT 36.9 42.9 41.4    170 149      Cardiac Enzymes No results for input(s): CPK, CKND1, ANDRÉS in the last 72 hours. No lab exists for component: CKRMB, TROIP   Coagulation No results for input(s): PTP, INR, APTT, INREXT, INREXT in the last 72 hours. Lipid Panel No results found for: CHOL, CHOLPOCT, CHOLX, CHLST, CHOLV, 900742, HDL, HDLP, LDL, LDLC, DLDLP, 126570, VLDLC, VLDL, TGLX, TRIGL, TRIGP, TGLPOCT, CHHD, CHHDX   BNP No results for input(s): BNPP in the last 72 hours.    Liver Enzymes Recent Labs     07/08/22  0327   TP 6.3*   ALB 3.1*   AP 64      Thyroid Studies No results found for: T4, T3U, TSH, TSHEXT, TSHEXT     Procedures/imaging: see electronic medical records for all procedures/Xrays and details which were not copied into this note but were reviewed prior to creation of Plan

## 2022-07-08 NOTE — ROUTINE PROCESS
1900: Bedside and Verbal shift change report given to CONSTANCE Montes RN (oncoming nurse) by Kellie Marinelli. Kandis Murray RN (offgoing nurse). Report included the following information SBAR, Kardex, ED Summary, Intake/Output, MAR, Recent Results, Med Rec Status and Cardiac Rhythm NSR.     2000: Shift assessment completed per flow sheet. Alert and awake; A/Ox4; no pain or discomfort noted; small abrasion on left hand that pt states hurts, clean and covered with bandage; no seizure like activity noted; will continue to monitor. 0000: Reassessment completed per flow sheet without change. 0400: Shift assessment completed per flow sheet without change.

## 2022-07-08 NOTE — PROGRESS NOTES
Comprehensive Nutrition Assessment    Type and Reason for Visit: Reassess    Nutrition Recommendations/Plan:   1. Advance diet when feasible to meet nutritional needs. Avoid prolong inadequate nutrition status. Malnutrition Assessment:  Malnutrition Status: At risk for malnutrition (specify) (07/08/22 0941)      Nutrition Assessment:    Admitted with acute respiratory failure, Status epilepticus. Extubated 7/7/22. No documentation of tube feeding. Feeding tube is also removed at this time. NPO at this time. Nutrition Related Findings:    protonix, humalog, D5% -0.45% NaCl @ 25ml. Wound Type: None    Current Nutrition Intake & Therapies:  Average Meal Intake: NPO     DIET NPO Sips of Water with Meds    Anthropometric Measures:  Height: 5' 5.98\" (167.6 cm)  Ideal Body Weight (IBW): 142 lbs (65 kg)  Admission Body Weight: 179 lb 14.3 oz  Current Body Wt:  81.6 kg (179 lb 14.3 oz), 126.7 % IBW. Bed scale  Current BMI (kg/m2): 29  Usual Body Weight: 84.8 kg (187 lb) (4/30/22)  % Weight Change (Calculated): -3.8  Weight Adjustment: No adjustment                 BMI Category: Overweight (BMI 25.0-29. 9)    Estimated Daily Nutrient Needs:  Energy Requirements Based On: Formula  Weight Used for Energy Requirements: Current  Energy (kcal/day): 5587-9058  Weight Used for Protein Requirements: Current (0.8-1g)  Protein (g/day): 82  Method Used for Fluid Requirements: 1 ml/kcal  Fluid (ml/day): 2154-1776    Nutrition Diagnosis:   · Inadequate oral intake related to acute injury/trauma as evidenced by NPO or clear liquid status due to medical condition      Nutrition Interventions:   Food and/or Nutrient Delivery: Start oral diet  Nutrition Education/Counseling: No recommendations at this time  Coordination of Nutrition Care: Continue to monitor while inpatient       Goals:  Previous Goal Met: No progress toward goal(s)  Goals: Initiate PO diet,by next RD assessment       Nutrition Monitoring and Evaluation: Behavioral-Environmental Outcomes: None identified  Food/Nutrient Intake Outcomes: Diet advancement/tolerance  Physical Signs/Symptoms Outcomes: Biochemical data,Skin,Weight,GI status,Meal time behavior,Chewing or swallowing    Discharge Planning:    No discharge needs at this time    Aileen Belle RD

## 2022-07-08 NOTE — PROGRESS NOTES
NEUROLOGY PROGRESS NOTE        Patient: Delmy Henry        Sex: male          DOA: 7/4/2022  YOB: 1995      Age:  33 y.o.         LOS: 4 days     Identification:  [id number] male with past medical history of refractory seizure presents with multiple seizures. SUBJECTIVE:   Extubated yesterday. Patient had a no recurrent seizure overnight. He told me that he went to smoke marijuana with his friend before the seizure. He feels returned to his normal self now. REVIEW OF SYSTEMS: UTO    OBJECTIVE:      Visit Vitals  /74   Pulse 65   Temp 98.9 °F (37.2 °C)   Resp 11   Ht 5' 5.98\" (1.676 m)   Wt 81.6 kg (179 lb 14.3 oz)   SpO2 99%   BMI 29.05 kg/m²     Physical Exam:  GEN: Alert, NAD  EYES: conjunctiva normal, lids with out lesions  HEENT: MMM. HEART: RRR +S1 +S2  LUNGS: CTA B/L no rales or rhonchi. ABDOMEN: Soft, non-tender. EXTREMITIES: No edema cyanosis  SKIN: no rashes or skin breakdown, no nodules  NEURO: Awake, alert and oriented. Bilateral face is symmetric, normal sensation bilateral face. PERRLA, visual field is normal.  5/5 all 4 extremities. Light touch is normal grossly all 4 extremities. DTR absent throughout. Bilateral toes are mute.     Current Facility-Administered Medications   Medication Dose Route Frequency    amLODIPine (NORVASC) tablet 2.5 mg  2.5 mg Oral DAILY    budesonide (PULMICORT) 500 mcg/2 ml nebulizer suspension  500 mcg Nebulization BID RT    amoxicillin-clavulanate (AUGMENTIN) 875-125 mg per tablet 1 Tablet  1 Tablet Oral Q12H    phenytoin (DILANTIN) injection 100 mg  100 mg IntraVENous DAILY    clonazePAM (KlonoPIN) disintegrating tablet 0.5 mg  0.5 mg Oral QHS    zonisamide (ZONEGRAN) capsule 300 mg  300 mg Oral QHS    valproic acid (as sodium salt) (DEPAKENE) 250 mg/5 mL (5 mL) oral solution 1,000 mg  1,000 mg Oral Q12H    insulin lispro (HUMALOG) injection   SubCUTAneous Q6H    glucose chewable tablet 16 g  4 Tablet Oral PRN  glucagon (GLUCAGEN) injection 1 mg  1 mg IntraMUSCular PRN    dextrose 10% infusion 0-250 mL  0-250 mL IntraVENous PRN    dextrose 5 % - 0.45% NaCl infusion  25 mL/hr IntraVENous CONTINUOUS    arformoteroL (BROVANA) neb solution 15 mcg  15 mcg Nebulization BID RT    ELECTROLYTE REPLACEMENT PROTOCOL - Potassium Standard Dosing   1 Each Other PRN    ELECTROLYTE REPLACEMENT PROTOCOL - Magnesium   1 Each Other PRN    ELECTROLYTE REPLACEMENT PROTOCOL - Phosphorus  Standard Dosing  1 Each Other PRN    ELECTROLYTE REPLACEMENT PROTOCOL - Calcium   1 Each Other PRN    acetaminophen (TYLENOL) solution 650 mg  650 mg Per G Tube Q6H PRN    [Held by provider] propofol (DIPRIVAN) 10 mg/mL infusion  0-50 mcg/kg/min IntraVENous TITRATE    [Held by provider] midazolam in normal saline (VERSED) 1 mg/mL infusion  0-5 mg/hr IntraVENous TITRATE    midazolam (VERSED) injection 1 mg  1 mg IntraVENous Q2H PRN    pantoprazole (PROTONIX) 40 mg in 0.9% sodium chloride 10 mL injection  40 mg IntraVENous Q24H    [Held by provider] 0.9% sodium chloride infusion  100 mL/hr IntraVENous CONTINUOUS    enoxaparin (LOVENOX) injection 40 mg  40 mg SubCUTAneous Q24H    albuterol CONCENTRATE 2.5mg/0.5 mL neb soln  2.5 mg Nebulization Q4H PRN    [Held by provider] fentaNYL (PF) 900 mcg/30 ml infusion soln  0-50 mcg/hr IntraVENous TITRATE    lacosamide (VIMPAT) 200 mg in 0.9% sodium chloride 100 mL IVPB  200 mg IntraVENous BID    midazolam (VERSED) injection 4 mg  4 mg IntraVENous Q4H PRN       Laboratory  Recent Results (from the past 24 hour(s))   GLUCOSE, POC    Collection Time: 07/07/22 12:50 PM   Result Value Ref Range    Glucose (POC) 89 70 - 110 mg/dL   GLUCOSE, POC    Collection Time: 07/07/22  6:11 PM   Result Value Ref Range    Glucose (POC) 92 70 - 110 mg/dL   POTASSIUM    Collection Time: 07/07/22  7:40 PM   Result Value Ref Range    Potassium 4.6 3.5 - 5.5 mmol/L   GLUCOSE, POC    Collection Time: 07/07/22 11:27 PM Result Value Ref Range    Glucose (POC) 106 70 - 110 mg/dL   CBC W/O DIFF    Collection Time: 07/08/22  3:27 AM   Result Value Ref Range    WBC 6.6 4.6 - 13.2 K/uL    RBC 4.03 (L) 4.35 - 5.65 M/uL    HGB 12.7 (L) 13.0 - 16.0 g/dL    HCT 36.9 36.0 - 48.0 %    MCV 91.6 78.0 - 100.0 FL    MCH 31.5 24.0 - 34.0 PG    MCHC 34.4 31.0 - 37.0 g/dL    RDW 11.0 (L) 11.6 - 14.5 %    PLATELET 712 094 - 657 K/uL    MPV 11.1 9.2 - 11.8 FL    NRBC 0.0 0  WBC    ABSOLUTE NRBC 0.00 0.00 - 9.19 K/uL   METABOLIC PANEL, COMPREHENSIVE    Collection Time: 07/08/22  3:27 AM   Result Value Ref Range    Sodium 140 136 - 145 mmol/L    Potassium 3.9 3.5 - 5.5 mmol/L    Chloride 110 100 - 111 mmol/L    CO2 25 21 - 32 mmol/L    Anion gap 5 3.0 - 18 mmol/L    Glucose 93 74 - 99 mg/dL    BUN 10 7.0 - 18 MG/DL    Creatinine 0.91 0.6 - 1.3 MG/DL    BUN/Creatinine ratio 11 (L) 12 - 20      GFR est AA >60 >60 ml/min/1.73m2    GFR est non-AA >60 >60 ml/min/1.73m2    Calcium 8.7 8.5 - 10.1 MG/DL    Bilirubin, total 0.5 0.2 - 1.0 MG/DL    ALT (SGPT) 16 16 - 61 U/L    AST (SGOT) 23 10 - 38 U/L    Alk.  phosphatase 64 45 - 117 U/L    Protein, total 6.3 (L) 6.4 - 8.2 g/dL    Albumin 3.1 (L) 3.4 - 5.0 g/dL    Globulin 3.2 2.0 - 4.0 g/dL    A-G Ratio 1.0 0.8 - 1.7     PHOSPHORUS    Collection Time: 07/08/22  3:27 AM   Result Value Ref Range    Phosphorus 3.5 2.5 - 4.9 MG/DL   MAGNESIUM    Collection Time: 07/08/22  3:27 AM   Result Value Ref Range    Magnesium 2.1 1.6 - 2.6 mg/dL   CALCIUM, IONIZED    Collection Time: 07/08/22  3:36 AM   Result Value Ref Range    Ionized Calcium 1.23 1.12 - 1.32 MMOL/L   GLUCOSE, POC    Collection Time: 07/08/22  6:08 AM   Result Value Ref Range    Glucose (POC) 99 70 - 110 mg/dL   GLUCOSE, POC    Collection Time: 07/08/22 11:44 AM   Result Value Ref Range    Glucose (POC) 85 70 - 110 mg/dL       Radiology:  XR WRIST LT AP/LAT/OBL MIN 3V    Result Date: 7/4/2022  EXAM: 3 views left wrist CLINICAL INDICATION/HISTORY: Left wrist pain COMPARISON: None. _______________ FINDINGS: No fracture or dislocation. Joint spaces are maintained. _______________     1. No fracture or dislocation. XR ABD (KUB)    Result Date: 7/4/2022  EXAM: Frontal view of the abdomen CLINICAL INDICATION/HISTORY: OG tube placement COMPARISON: None. _______________ FINDINGS: NG/OG tube is in place with the tip in the right abdomen in the region of the gastric antrum. No bowel obstruction identified. _______________     1. NG/OG tube is in good position. CT HEAD WO CONT    Result Date: 7/4/2022  EXAM: CT head CLINICAL INDICATION/HISTORY: Fall, seizure COMPARISON: None. TECHNIQUE: Axial CT imaging of the head was performed without intravenous contrast. One or more dose reduction techniques were used on this CT: automated exposure control, adjustment of the mAs and/or kVp according to patient size, and iterative reconstruction techniques. The specific techniques used on this CT exam have been documented in the patient's electronic medical record. Digital Imaging and Communications in Medicine (DICOM) format image data are available to nonaffiliated external healthcare facilities or entities on a secured, media free, reciprocally searchable basis with patient authorization for at least a 12-month period after this study. _______________ FINDINGS: BRAIN AND EXTRA-AXIAL SPACES: Brain stimulator electrodes identified. No intracranial hemorrhage, midline shift, or uncal herniation. There are no areas of abnormal parenchymal attenuation. There are no abnormal extra-axial fluid collections. CALVARIUM: Postsurgical changes involving right frontoparietal calvarium. SINUSES: Clear. OTHER: None. _______________     1. No acute intracranial abnormalities. CT CHEST ABD PELV W CONT    Result Date: 7/4/2022  EXAM: CT of the chest, abdomen, and pelvis CLINICAL INDICATION/HISTORY: Seizure, chest pain, abdominal pain COMPARISON: None.  TECHNIQUE: Axial CT imaging of the chest, abdomen, and pelvis was performed with intravenous contrast. Multiplanar reformats were generated. One or more dose reduction techniques were used on this CT: automated exposure control, adjustment of the mAs and/or kVp according to patient size, and iterative reconstruction techniques. The specific techniques used on this CT exam have been documented in the patient's electronic medical record. Digital Imaging and Communications in Medicine (DICOM) format image data are available to nonaffiliated external healthcare facilities or entities on a secured, media free, reciprocally searchable basis with patient authorization for at least a 12-month period after this study. _______________ FINDINGS: CHEST: LUNGS: Patchy atelectasis and/or infiltrate in the lower lobes. AIRWAY: Endotracheal tube is in place. Secretions identified in the airway. PLEURA: Normal, with no effusion or pneumothorax. MEDIASTINUM: Normal heart size. No pericardial effusion. Vasculature is unremarkable. LYMPH NODES: No enlarged lymph nodes. OTHER: None. =============== ABDOMEN/PELVIS: LIVER, BILIARY: Liver is unremarkable. No biliary dilation. Gallbladder is unremarkable. PANCREAS: Unremarkable. SPLEEN: Unremarkable. ADRENALS: Unremarkable. KIDNEYS/URETERS/BLADDER: Unremarkable. PELVIC ORGANS: Unremarkable. LYMPH NODES: No enlarged lymph nodes. GASTROINTESTINAL TRACT: No bowel dilation or wall thickening. VASCULATURE: Unremarkable. BONES: No acute or aggressive osseous abnormalities identified. OTHER: None. _______________     1. No traumatic injury identified. 2. Bilateral lower lobe opacities could relate to atelectasis, infiltrate, and/or aspiration. CT SPINE CERV WO CONT    Result Date: 7/4/2022  EXAM: CT cervical spine CLINICAL INDICATION/HISTORY: Trauma, neck pain. COMPARISON: None.  TECHNIQUE: Axial CT imaging of the cervical spine was performed from the skull base to the thoracic inlet without intravenous contrast. Multiplanar reformats were generated. One or more dose reduction techniques were used on this CT: automated exposure control, adjustment of the mAs and/or kVp according to patient size, and iterative reconstruction techniques. The specific techniques used on this CT exam have been documented in the patient's electronic medical record. Digital Imaging and Communications in Medicine (DICOM) format image data are available to nonaffiliated external healthcare facilities or entities on a secured, media free, reciprocally searchable basis with patient authorization for at least a 12-month period after this study. _______________ FINDINGS: VERTEBRAE AND DISCS: Alignment and vertebral height is maintained with no fracture or traumatic subluxation identified. SPINAL CANAL AND FORAMINA: Patent without significant bony canal or foramina encroachment. PREVERTEBRAL SOFT TISSUES: No prevertebral soft tissue swelling. VISIBLE PORTIONS OF POSTERIOR FOSSA/BRAIN: Unremarkable. LUNG APICES: Clear. OTHER: None. _______________     1. No cervical spine fracture. XR CHEST PORT    Result Date: 7/5/2022  EXAM: XR CHEST PORT CLINICAL INDICATION/HISTORY: On ventilator -Additional: None COMPARISON: One day prior TECHNIQUE: Portable frontal view of the chest _______________ FINDINGS: SUPPORT DEVICES: Endotracheal and enteric tubes unchanged. HEART AND MEDIASTINUM: Cardiomediastinal silhouette within normal limits. LUNGS AND PLEURAL SPACES: No dense consolidation, large effusion or pneumothorax. _______________     No acute cardiopulmonary abnormality. XR CHEST PORT    Result Date: 7/4/2022  EXAM: CHEST RADIOGRAPH CLINICAL INDICATION/HISTORY: pain   > Additional: Chest pain COMPARISON: None TECHNIQUE: Portable frontal view of the chest _______________ FINDINGS: SUPPORT DEVICES: Endotracheal tube is in good position. HEART AND MEDIASTINUM: Heart size is normal. LUNGS AND PLEURAL SPACES: Bilateral basilar atelectasis and or infiltrate.  No pleural effusion or pneumothorax. BONES AND SOFT TISSUES: Stimulator generator projects over the left chest. _______________     1. Endotracheal tube is in good position. 2. Bilateral basilar atelectasis and/or infiltrate. ASSESSMENT/IMPRESSION:   31-year-old male with longstanding refractory seizures status post VNS and bilateral temporal RNS placement presents with seizure and postictal confusion. He is intubated for airway protection. Head CT is normal.  1. Epilepsy:  Status epilepticus has resolved. 2. Status epilepticus resolved. It is provoked by marijuana.     RECOMMENDATIONS:  1. Continue Depakote 1000 mg twice daily and Vimpat 200 mg twice daily. Continue Zonegran 300 mg nightly and Klonopin 0.5 mg nightly. Stop Dilantin.       Patient can be transferred to floor and discharge from neurology standpoint once he is stable. Patient needs to follow-up with his neurologist at [facility]. Neurology sign off. Please do not hesitate to call for questions. Signed:   Antwan Silverman MD  7/8/2022  10:19 AM

## 2022-07-08 NOTE — PROGRESS NOTES
Alert and oriented x4, assessments and VS completed per orders, medications provided, pt has no reports of pain, provider notified of patient BP and no new order received, accu checks completed.

## 2022-07-09 VITALS
SYSTOLIC BLOOD PRESSURE: 154 MMHG | RESPIRATION RATE: 20 BRPM | TEMPERATURE: 98.1 F | DIASTOLIC BLOOD PRESSURE: 76 MMHG | WEIGHT: 179.9 LBS | HEIGHT: 66 IN | BODY MASS INDEX: 28.91 KG/M2 | HEART RATE: 62 BPM | OXYGEN SATURATION: 100 %

## 2022-07-09 PROBLEM — F12.20 TETRAHYDROCANNABINOL (THC) DEPENDENCE (HCC): Status: ACTIVE | Noted: 2022-07-09

## 2022-07-09 LAB
BACTERIA SPEC CULT: ABNORMAL
CA-I SERPL-SCNC: 1.18 MMOL/L (ref 1.12–1.32)
GLUCOSE BLD STRIP.AUTO-MCNC: 83 MG/DL (ref 70–110)
GLUCOSE BLD STRIP.AUTO-MCNC: 89 MG/DL (ref 70–110)
GLUCOSE BLD STRIP.AUTO-MCNC: 94 MG/DL (ref 70–110)
GRAM STN SPEC: ABNORMAL
MAGNESIUM SERPL-MCNC: 2.2 MG/DL (ref 1.6–2.6)
PHOSPHATE SERPL-MCNC: 2.6 MG/DL (ref 2.5–4.9)
SERVICE CMNT-IMP: ABNORMAL

## 2022-07-09 PROCEDURE — 82330 ASSAY OF CALCIUM: CPT

## 2022-07-09 PROCEDURE — C9254 INJECTION, LACOSAMIDE: HCPCS | Performed by: PSYCHIATRY & NEUROLOGY

## 2022-07-09 PROCEDURE — 92526 ORAL FUNCTION THERAPY: CPT

## 2022-07-09 PROCEDURE — 82962 GLUCOSE BLOOD TEST: CPT

## 2022-07-09 PROCEDURE — 74011000250 HC RX REV CODE- 250: Performed by: INTERNAL MEDICINE

## 2022-07-09 PROCEDURE — 74011250637 HC RX REV CODE- 250/637: Performed by: FAMILY MEDICINE

## 2022-07-09 PROCEDURE — 74011000258 HC RX REV CODE- 258: Performed by: PSYCHIATRY & NEUROLOGY

## 2022-07-09 PROCEDURE — 83735 ASSAY OF MAGNESIUM: CPT

## 2022-07-09 PROCEDURE — 74011250637 HC RX REV CODE- 250/637: Performed by: INTERNAL MEDICINE

## 2022-07-09 PROCEDURE — 84100 ASSAY OF PHOSPHORUS: CPT

## 2022-07-09 PROCEDURE — 36415 COLL VENOUS BLD VENIPUNCTURE: CPT

## 2022-07-09 PROCEDURE — 94640 AIRWAY INHALATION TREATMENT: CPT

## 2022-07-09 PROCEDURE — 74011250636 HC RX REV CODE- 250/636: Performed by: PSYCHIATRY & NEUROLOGY

## 2022-07-09 RX ORDER — LACOSAMIDE 200 MG/1
200 TABLET ORAL 2 TIMES DAILY
Qty: 60 TABLET | Refills: 0 | Status: SHIPPED | OUTPATIENT
Start: 2022-07-09 | End: 2022-08-08

## 2022-07-09 RX ORDER — CLONAZEPAM 0.5 MG/1
0.5 TABLET, ORALLY DISINTEGRATING ORAL EVERY EVENING
Qty: 15 TABLET | Refills: 0 | Status: SHIPPED | OUTPATIENT
Start: 2022-07-09 | End: 2022-07-24

## 2022-07-09 RX ORDER — VALPROIC ACID 250 MG/1
1000 CAPSULE, LIQUID FILLED ORAL
Qty: 120 CAPSULE | Refills: 0 | Status: SHIPPED | OUTPATIENT
Start: 2022-07-09

## 2022-07-09 RX ORDER — ALBUMIN HUMAN 250 G/1000ML
SOLUTION INTRAVENOUS
Status: DISCONTINUED
Start: 2022-07-09 | End: 2022-07-09 | Stop reason: HOSPADM

## 2022-07-09 RX ORDER — ZONISAMIDE 100 MG/1
300 CAPSULE ORAL
Qty: 90 CAPSULE | Refills: 0 | Status: SHIPPED | OUTPATIENT
Start: 2022-07-09 | End: 2022-08-08

## 2022-07-09 RX ADMIN — AMLODIPINE BESYLATE 2.5 MG: 5 TABLET ORAL at 09:22

## 2022-07-09 RX ADMIN — AMOXICILLIN AND CLAVULANATE POTASSIUM 1 TABLET: 875; 125 TABLET, FILM COATED ORAL at 09:22

## 2022-07-09 RX ADMIN — VALPROIC ACID 1000 MG: 250 SOLUTION ORAL at 09:22

## 2022-07-09 RX ADMIN — ARFORMOTEROL TARTRATE 15 MCG: 15 SOLUTION RESPIRATORY (INHALATION) at 07:28

## 2022-07-09 RX ADMIN — SODIUM CHLORIDE 200 MG: 9 INJECTION, SOLUTION INTRAVENOUS at 09:22

## 2022-07-09 RX ADMIN — BUDESONIDE 500 MCG: 0.5 INHALANT RESPIRATORY (INHALATION) at 07:28

## 2022-07-09 NOTE — PROGRESS NOTES
Problem: Dysphagia (Adult)  Goal: *Acute Goals and Plan of Care (Insert Text)  Description: Patient will:  1. Tolerate PO trials with 0 s/s overt distress in 4/5 trials  2. Participate in training and education related to continued aspiration risk, diet recs and compensatory strategies     Recommend:   Regular diet with thin liquids  Meds per patient preference  Aspiration precautions  HOB >45 degrees during all intake and for at least 30 min after po   Small bites/sips, Slow rate of intake     Outcome: Resolved/Met     SPEECH LANGUAGE PATHOLOGY BEDSIDE SWALLOW EVALUATION AND DISCHARGE    Patient: Marietta Faust (33 y.o. male)  Date: 7/9/2022  Primary Diagnosis: Status epilepticus ([facility] [address] [G40.901]  Precautions:      PLOF: as per H&P    ASSESSMENT :  Clinical beside swallow eval completed per MD orders. Pt A&Ox4. Functional communication. Intelligibility >90%. Cognitive-linguistic function appears intact. OM examination revealed oral motor structures functional for mastication and deglutition. Presented with thin liquid, puree, and solid trials. Exhibited adequate bolus cohesion, manipulation and A-P transit. Further exhibited functional swallow timing/reflex and hyolaryngeal excursion. Pt able to manipulate and clear with 0 clinical s/s aspiration and/or oropharyngeal dysphagia. Pt safe for regular solid, thin liquid diet. 0 formal ST needs for dysphagia indicated at this time. SLP educated pt on role of speech therapist in current setting with re: speech/swallow; verbalized comprehension. SLP available for re-evaluation if indicated by MD. Results d/w RN, Vishal Brand. Mother of patient came to RN station seeking RN. Extensive education provided to mother about patients current status, recommendation, role of ST in acute setting, outpatient setting, and ST services. All questions answered. Mother verbalized understanding.      Thank you for this referral.   Joanne Coker, ROBERT     PLAN :  Recommendations and Planned Interventions:  No formal ST needs ID'd for dysphagia. Eval only. Discharge Recommendations: None     SUBJECTIVE:   Patient stated i'm waiting for my mom. OBJECTIVE:     Past Medical History:   Diagnosis Date    Asthma     Seizure ([address]     Status post VNS (vagus nerve stimulator) placement    No past surgical history on file. Home Situation:   Home Situation  Patient Expects to be Discharged to[de-identified] Home with family assistance  Diet prior to admission: regular with thin liquids  Current Diet:  regular with thin liquids   Cognitive and Communication Status:  Neurologic State: Alert  Orientation Level: Oriented X4  Cognition: Appropriate decision making,Appropriate for age attention/concentration,Appropriate safety awareness,Follows commands  Oral Assessment:  Oral Assessment  Labial: No impairment  Dentition: Natural;Full  Oral Hygiene: adequate  Lingual: No impairment  Velum: No impairment  Mandible: No impairment  P.O. Trials:  Patient Position: 46 at [facility]  Vocal quality prior to P.O.: No impairment  Consistency Presented: Solid; Thin liquid  How Presented: Self-fed/presented;Straw;Successive swallows  Bolus Acceptance: No impairment  Bolus Formation/Control: No impairment  Propulsion: No impairment  Oral Residue: None  Initiation of Swallow: No impairment  Laryngeal Elevation: Functional  Aspiration Signs/Symptoms: None  Pharyngeal Phase Characteristics: No impairment, issues, or problems   Effective Modifications: None  Cues for Modifications: None  Oral Phase Severity: No impairment  Pharyngeal Phase Severity : No impairment    PAIN:  Pain level pre-treatment: 0/10   Pain level post-treatment: 0/10   After evaluation:   []            Patient left in no apparent distress sitting up in chair  [x]            Patient left in no apparent distress in bed  [x]            Call bell left within reach  [x]            Nursing notified  [x]            Family present  []            Caregiver present  []            Bed alarm activated      COMMUNICATION/EDUCATION:   [x]            Aspiration precautions; swallow safety; compensatory techniques. [x]            Patient/family have participated as able in goal setting and plan of care. []            Patient/family agree to work toward stated goals and plan of care. []            Patient understands intent and goals of therapy; neutral about participation. []            Patient unable to participate in goal setting/plan of care; educ ongoing with interdisciplinary staff  [x]         Posted safety precautions in patient's room.     Thank you for this referral.  Ange Huffman M.S., CFY-SLP  Speech-Language Pathologist

## 2022-07-09 NOTE — PROGRESS NOTES
Problem: Ventilator Management  Goal: *Adequate oxygenation and ventilation  Outcome: Progressing Towards Goal  Goal: *Patient maintains clear airway/free of aspiration  Outcome: Progressing Towards Goal  Goal: *Absence of infection signs and symptoms  Outcome: Progressing Towards Goal  Goal: *Normal spontaneous ventilation  Outcome: Progressing Towards Goal     Problem: Patient Education: Go to Patient Education Activity  Goal: Patient/Family Education  Outcome: Progressing Towards Goal     Problem: Non-Violent Restraints  Goal: Removal from restraints as soon as assessed to be safe  Outcome: Progressing Towards Goal  Goal: No harm/injury to patient while restraints in use  Outcome: Progressing Towards Goal  Goal: Patient's dignity will be maintained  Outcome: Progressing Towards Goal  Goal: Patient Interventions  Outcome: Progressing Towards Goal     Problem: Falls - Risk of  Goal: *Absence of Falls  Description: Document Clydene Bone Fall Risk and appropriate interventions in the flowsheet.   Outcome: Progressing Towards Goal  Note: Fall Risk Interventions:  Mobility Interventions: Patient to call before getting OOB,Utilize walker, cane, or other assistive device    Mentation Interventions: Adequate sleep, hydration, pain control,Bed/chair exit alarm,Door open when patient unattended,Evaluate medications/consider consulting pharmacy    Medication Interventions: Teach patient to arise slowly,Patient to call before getting OOB    Elimination Interventions: Call light in reach,Patient to call for help with toileting needs,Toileting schedule/hourly rounds    History of Falls Interventions: Bed/chair exit alarm,Door open when patient unattended,Evaluate medications/consider consulting pharmacy,Room close to nurse's station         Problem: Patient Education: Go to Patient Education Activity  Goal: Patient/Family Education  Outcome: Progressing Towards Goal     Problem: Pressure Injury - Risk of  Goal: *Prevention of pressure injury  Description: Document Shankar Scale and appropriate interventions in the flowsheet. Outcome: Progressing Towards Goal  Note: Pressure Injury Interventions:  Sensory Interventions: Avoid rigorous massage over bony prominences,Keep linens dry and wrinkle-free,Maintain/enhance activity level,Minimize linen layers,Monitor skin under medical devices,Pad between skin to skin,Pressure redistribution bed/mattress (bed type)    Moisture Interventions: Absorbent underpads,Check for incontinence Q2 hours and as needed,Internal/External urinary devices,Limit adult briefs,Maintain skin hydration (lotion/cream),Minimize layers    Activity Interventions: Increase time out of bed,Pressure redistribution bed/mattress(bed type)    Mobility Interventions: HOB 30 degrees or less,Pressure redistribution bed/mattress (bed type)    Nutrition Interventions: Discuss nutritional consult with provider,Offer support with meals,snacks and hydration,Document food/fluid/supplement intake    Friction and Shear Interventions: HOB 30 degrees or less,Lift team/patient mobility team,Minimize layers                Problem: Patient Education: Go to Patient Education Activity  Goal: Patient/Family Education  Outcome: Progressing Towards Goal     Problem: Pressure Injury - Risk of  Goal: *Prevention of pressure injury  Description: Document Shankar Scale and appropriate interventions in the flowsheet.   Outcome: Progressing Towards Goal  Note: Pressure Injury Interventions:  Sensory Interventions: Avoid rigorous massage over bony prominences,Keep linens dry and wrinkle-free,Maintain/enhance activity level,Minimize linen layers,Monitor skin under medical devices,Pad between skin to skin,Pressure redistribution bed/mattress (bed type)    Moisture Interventions: Absorbent underpads,Check for incontinence Q2 hours and as needed,Internal/External urinary devices,Limit adult briefs,Maintain skin hydration (lotion/cream),Minimize layers    Activity Interventions: Increase time out of bed,Pressure redistribution bed/mattress(bed type)    Mobility Interventions: HOB 30 degrees or less,Pressure redistribution bed/mattress (bed type)    Nutrition Interventions: Discuss nutritional consult with provider,Offer support with meals,snacks and hydration,Document food/fluid/supplement intake    Friction and Shear Interventions: HOB 30 degrees or less,Lift team/patient mobility team,Minimize layers                Problem: Pressure Injury - Risk of  Goal: *Prevention of pressure injury  Description: Document Shankar Scale and appropriate interventions in the flowsheet.   Outcome: Progressing Towards Goal  Note: Pressure Injury Interventions:  Sensory Interventions: Avoid rigorous massage over bony prominences,Keep linens dry and wrinkle-free,Maintain/enhance activity level,Minimize linen layers,Monitor skin under medical devices,Pad between skin to skin,Pressure redistribution bed/mattress (bed type)    Moisture Interventions: Absorbent underpads,Check for incontinence Q2 hours and as needed,Internal/External urinary devices,Limit adult briefs,Maintain skin hydration (lotion/cream),Minimize layers    Activity Interventions: Increase time out of bed,Pressure redistribution bed/mattress(bed type)    Mobility Interventions: HOB 30 degrees or less,Pressure redistribution bed/mattress (bed type)    Nutrition Interventions: Discuss nutritional consult with provider,Offer support with meals,snacks and hydration,Document food/fluid/supplement intake    Friction and Shear Interventions: HOB 30 degrees or less,Lift team/patient mobility team,Minimize layers                Problem: Patient Education: Go to Patient Education Activity  Goal: Patient/Family Education  Outcome: Progressing Towards Goal     Problem: General Infection Care Plan (Adult and Pediatric)  Goal: Improvement in signs and symptoms of infection  Outcome: Progressing Towards Goal  Goal: *Optimize nutritional status  Outcome: Progressing Towards Goal     Problem: Patient Education: Go to Patient Education Activity  Goal: Patient/Family Education  Outcome: Progressing Towards Goal     Problem: Infection - Risk of, Urinary Catheter-Associated Urinary Tract Infection  Goal: *Absence of infection signs and symptoms  Outcome: Progressing Towards Goal     Problem: Patient Education: Go to Patient Education Activity  Goal: Patient/Family Education  Outcome: Progressing Towards Goal

## 2022-07-09 NOTE — PROGRESS NOTES
2005 - Head to toe assessment completed at this time. Pt denies CP and SOB. Pt rated pain 0/10. 18G Left AC INT. Incentive spirometer encouraged. Bed in lowest position. Call bell and personal belongings within reach. Will continue to monitor. 2022 - IVF started per MAR.     0000 - IV infiltrated and was removed. 20G Right FA placed at this time. 0521 - Pt blood pressure elevated. Dr. Zita Blanchard made aware. No new orders received.

## 2022-07-09 NOTE — ROUTINE PROCESS
Bedside and Verbal shift change report given to Jayant Tidwell RN by Tian Ocampo RN. Report included the following information SBAR and Kardex.

## 2022-07-09 NOTE — DISCHARGE INSTRUCTIONS
DISCHARGE SUMMARY from Nurse    PATIENT INSTRUCTIONS:    What to do at Home:  Recommended activity: Activity as tolerated, please follow up with PCP for work clearance     You have been cleared by speech for a regular diet. Having a sore throat is common after intubation. If your sore throat worsens, please follow up with primary care provider. *  Please give a list of your current medications to your Primary Care Provider. *  Please update this list whenever your medications are discontinued, doses are      changed, or new medications (including over-the-counter products) are added. *  Please carry medication information at all times in case of emergency situations. These are general instructions for a healthy lifestyle:    No smoking/ No tobacco products/ Avoid exposure to second hand smoke  Surgeon General's Warning:  Quitting smoking now greatly reduces serious risk to your health. Obesity, smoking, and sedentary lifestyle greatly increases your risk for illness    A healthy diet, regular physical exercise & weight monitoring are important for maintaining a healthy lifestyle        Patient armband removed and shredded. The discharge information has been reviewed with the patient and mother. The patient verbalized understanding. Discharge medications reviewed with the patient and appropriate educational materials and side effects teaching were provided.   ___________________________________________________________________________________________________________________________________

## 2022-07-09 NOTE — PROGRESS NOTES
07/08/22 2039   Oxygen Therapy   O2 Sat (%) 97 %   Pulse via Oximetry 97 beats per minute   O2 Device None (Room air)   pt refused nebulized treatment at this time
